# Patient Record
Sex: FEMALE | Race: BLACK OR AFRICAN AMERICAN | Employment: FULL TIME | URBAN - METROPOLITAN AREA
[De-identification: names, ages, dates, MRNs, and addresses within clinical notes are randomized per-mention and may not be internally consistent; named-entity substitution may affect disease eponyms.]

---

## 2019-10-11 LAB — PAP SMEAR, EXTERNAL: NORMAL

## 2020-10-26 ENCOUNTER — NURSE TRIAGE (OUTPATIENT)
Dept: OBGYN CLINIC | Age: 30
End: 2020-10-26

## 2020-10-26 DIAGNOSIS — B96.89 BV (BACTERIAL VAGINOSIS): Primary | ICD-10-CM

## 2020-10-26 DIAGNOSIS — B37.9 YEAST INFECTION: Primary | ICD-10-CM

## 2020-10-26 DIAGNOSIS — N76.0 BV (BACTERIAL VAGINOSIS): Primary | ICD-10-CM

## 2020-10-26 RX ORDER — METRONIDAZOLE 500 MG/1
500 TABLET ORAL 2 TIMES DAILY
Qty: 14 TAB | Refills: 0 | Status: SHIPPED | OUTPATIENT
Start: 2020-10-26 | End: 2020-11-02

## 2020-10-26 RX ORDER — FLUCONAZOLE 150 MG/1
150 TABLET ORAL DAILY
Qty: 1 TAB | Refills: 0 | Status: SHIPPED | OUTPATIENT
Start: 2020-10-26 | End: 2020-10-27

## 2020-10-26 NOTE — TELEPHONE ENCOUNTER
[facility] would like rx sent to pharmacy. Spoke with Dr DUNHAM and she clifton ok.

## 2020-10-26 NOTE — TELEPHONE ENCOUNTER
Per MD send prescription requested and advise patient if medication does not work to schedule an appt.

## 2020-10-26 NOTE — TELEPHONE ENCOUNTER
Patient called back to office reports that she was sent pills to her pharmacy does not work. She reports that pills and cream makes it work. Suggested to patient that she try something over-the-counter. She is requesting Diflucan rather that flagyl that was sent for her sent to her [facility] in [facility].

## 2021-01-02 VITALS
HEIGHT: 59 IN | WEIGHT: 164.6 LBS | SYSTOLIC BLOOD PRESSURE: 134 MMHG | DIASTOLIC BLOOD PRESSURE: 95 MMHG | HEART RATE: 64 BPM | BODY MASS INDEX: 33.18 KG/M2

## 2021-01-02 PROBLEM — B96.89 BACTERIAL VAGINOSIS: Status: ACTIVE | Noted: 2020-03-09

## 2021-01-02 PROBLEM — N76.0 BACTERIAL VAGINOSIS: Status: ACTIVE | Noted: 2020-03-09

## 2021-01-02 RX ORDER — BACLOFEN 10 MG/1
TABLET ORAL 3 TIMES DAILY
COMMUNITY
End: 2021-02-08

## 2021-01-02 RX ORDER — CITALOPRAM 10 MG/1
TABLET ORAL DAILY
COMMUNITY
End: 2021-02-08

## 2021-01-02 RX ORDER — IBUPROFEN 800 MG/1
TABLET ORAL
COMMUNITY
End: 2021-02-08

## 2021-02-08 ENCOUNTER — OFFICE VISIT (OUTPATIENT)
Dept: OBGYN CLINIC | Age: 31
End: 2021-02-08
Payer: MEDICAID

## 2021-02-08 VITALS
BODY MASS INDEX: 35.28 KG/M2 | DIASTOLIC BLOOD PRESSURE: 85 MMHG | HEART RATE: 68 BPM | HEIGHT: 59 IN | SYSTOLIC BLOOD PRESSURE: 141 MMHG | WEIGHT: 175 LBS

## 2021-02-08 DIAGNOSIS — N89.8 VAGINAL ODOR: ICD-10-CM

## 2021-02-08 DIAGNOSIS — B96.89 BACTERIAL VAGINOSIS: ICD-10-CM

## 2021-02-08 DIAGNOSIS — Z11.3 VENEREAL DISEASE SCREENING: ICD-10-CM

## 2021-02-08 DIAGNOSIS — N76.0 BACTERIAL VAGINOSIS: ICD-10-CM

## 2021-02-08 DIAGNOSIS — N92.3 INTERMENSTRUAL SPOTTING: Primary | ICD-10-CM

## 2021-02-08 DIAGNOSIS — N89.8 VAGINAL PRURITUS: ICD-10-CM

## 2021-02-08 LAB — WET MOUNT POCT, WMPOCT: NORMAL

## 2021-02-08 PROCEDURE — 99214 OFFICE O/P EST MOD 30 MIN: CPT | Performed by: OBSTETRICS & GYNECOLOGY

## 2021-02-08 PROCEDURE — 87210 SMEAR WET MOUNT SALINE/INK: CPT | Performed by: OBSTETRICS & GYNECOLOGY

## 2021-02-08 RX ORDER — FLUCONAZOLE 150 MG/1
TABLET ORAL
Qty: 1 TAB | Refills: 1 | Status: SHIPPED | OUTPATIENT
Start: 2021-02-08 | End: 2021-03-11 | Stop reason: ALTCHOICE

## 2021-02-08 RX ORDER — METRONIDAZOLE 500 MG/1
500 TABLET ORAL EVERY 12 HOURS
Qty: 14 TAB | Refills: 1 | Status: SHIPPED | OUTPATIENT
Start: 2021-02-08 | End: 2021-02-15

## 2021-02-08 NOTE — PROGRESS NOTES
Rachana Gilmore is a 35 y.o. female [id number], lmp 1/27/21, who presents today for the following:  Chief Complaint   Patient presents with    Vaginitis     Patient c/o vaginal spotting and itching      Patient states she has been having intermenstrual spotting times a month and a half. This is the first occurrence of such. She is not on any hormonal contraceptives. She has vaginal odor x a few weeks. She is sexually active with one male partner. Denies h/o STIs. Denies abnml vaginal discharge. +Vaginal pruritus. Review of Systems   Constitutional: Negative for chills and fever. Respiratory: Negative for shortness of breath. Cardiovascular: Negative for chest pain. Gastrointestinal: Negative for abdominal pain, constipation, diarrhea, nausea and vomiting. Genitourinary: Negative for dysuria. No current outpatient medications on file. No current facility-administered medications for this visit. Past Medical History:   Diagnosis Date    Anxiety     Herpes simplex            BP (!) 141/85 (BP 1 Location: Left upper arm, BP Patient Position: Sitting, BP Cuff Size: Adult)   Pulse 68   Ht 4' 11\" (1.499 m)   Wt 175 lb (79.4 kg)   LMP 01/27/2021 (Approximate)   BMI 35.35 kg/m²    Physical Exam  Constitutional:       Appearance: Normal appearance. Genitourinary:      Genitourinary Comments: Vulva: no masses, atrophy, or lesions. Vagina: no tenderness, erythema, cystocele, rectocele, abnormal vaginal discharge, or vesicle(s), lesions, or ulcers. Cervix: no cervical motion tenderness; thin white moderate amt of discharge; swab obtained; Uterus: normal size and shape and midline, mobile, non-tender, and no uterine prolapse. Bladder/Urethra: no urethral discharge or mass and normal meatus and bladder non distended. Adnexa/Parametria: no parametrial tenderness or mass and no adnexal tenderness or ovarian mass. HENT:      Head: Normocephalic and atraumatic.    Eyes:      Extraocular Movements: Extraocular movements intact. Neck:      Musculoskeletal: Normal range of motion. Pulmonary:      Effort: Pulmonary effort is normal.   Abdominal:      General: Abdomen is flat. Palpations: Abdomen is soft. Neurological:      Mental Status: She is alert and oriented to person, place, and time. Skin:     General: Skin is warm and dry. Psychiatric:         Mood and Affect: Mood normal.         Behavior: Behavior normal.   Vitals signs reviewed. Results for orders placed or performed in visit on 02/08/21   AMB POC SMEAR, STAIN & INTERPRET, WET MOUNT   Result Value Ref Range    Wet mount (POC)      Narrative    Wet mount:   +clue cells. Neg for yeast. Neg for trich. Koh prep: + whiff; neg yeast         Assessment/plan:     ICD-10-CM ICD-9-CM    1. Intermenstrual spotting  N92.3 626.6 US PELV NON OBS   2. Vaginal odor  N89.8 625.8 AMB POC SMEAR, STAIN & INTERPRET, WET MOUNT   3. Vaginal pruritus  N89.8 698.1 AMB POC SMEAR, STAIN & INTERPRET, WET MOUNT   4. Bacterial vaginosis  N76.0 616.10 metroNIDAZOLE (FLAGYL) 500 mg tablet    B96.89 041.9 fluconazole (DIFLUCAN) 150 mg tablet   5. Venereal disease screening  Z11.3 V74.5 CT/NG/T.VAGINALIS AMPLIFICATION     Pelvic us ordered for intermenstrual spotting. Treat BV. Check GCT.

## 2021-02-09 LAB
C TRACH RRNA SPEC QL NAA+PROBE: NEGATIVE
N GONORRHOEA RRNA SPEC QL NAA+PROBE: NEGATIVE
T VAGINALIS DNA SPEC QL NAA+PROBE: POSITIVE

## 2021-02-10 PROBLEM — A59.01 TRICHOMONAS VAGINITIS: Status: ACTIVE | Noted: 2021-02-10

## 2021-02-10 NOTE — PROGRESS NOTES
I tried to call the patient-no answer. Left voicemail letting her know that we will call her back. Can you please let her know that the swab is showing she has trichomonas. I had already sent in Metronidazole at her visit so that will treat this. Discuss partner testing therapy and abstinence from sex until both treated. She needs a test of cure in 4 weeks. Thank you.

## 2021-02-12 ENCOUNTER — TELEPHONE (OUTPATIENT)
Dept: OBGYN CLINIC | Age: 31
End: 2021-02-12

## 2021-02-12 NOTE — TELEPHONE ENCOUNTER
Patient called to the office yesterday and results and instruction given. Appointment made for LEANNE.

## 2021-02-12 NOTE — TELEPHONE ENCOUNTER
----- Message from Silviano Castillo MD sent at 2/10/2021  2:16 PM EST -----  I tried to call the patient-no answer. Left voicemail letting her know that we will call her back. Can you please let her know that the swab is showing she has trichomonas. I had already sent in Metronidazole at her visit so that will treat this. Discuss partner testing therapy and abstinence from sex until both treated. She needs a test of cure in 4 weeks. Thank you.

## 2021-03-11 ENCOUNTER — OFFICE VISIT (OUTPATIENT)
Dept: OBGYN CLINIC | Age: 31
End: 2021-03-11
Payer: MEDICAID

## 2021-03-11 VITALS
HEART RATE: 88 BPM | DIASTOLIC BLOOD PRESSURE: 88 MMHG | OXYGEN SATURATION: 100 % | WEIGHT: 169 LBS | HEIGHT: 59 IN | BODY MASS INDEX: 34.07 KG/M2 | SYSTOLIC BLOOD PRESSURE: 136 MMHG | TEMPERATURE: 97.6 F

## 2021-03-11 DIAGNOSIS — A59.01 TRICHOMONAS VAGINITIS: ICD-10-CM

## 2021-03-11 DIAGNOSIS — N76.0 BACTERIAL VAGINOSIS: ICD-10-CM

## 2021-03-11 DIAGNOSIS — B96.89 BACTERIAL VAGINOSIS: ICD-10-CM

## 2021-03-11 DIAGNOSIS — N89.8 VAGINAL PRURITUS: ICD-10-CM

## 2021-03-11 DIAGNOSIS — R30.0 DYSURIA: Primary | ICD-10-CM

## 2021-03-11 LAB
BILIRUB UR QL STRIP: NEGATIVE
GLUCOSE UR-MCNC: NEGATIVE MG/DL
KETONES P FAST UR STRIP-MCNC: NEGATIVE MG/DL
PH UR STRIP: 6.5 [PH] (ref 4.6–8)
PROT UR QL STRIP: NEGATIVE
SP GR UR STRIP: 1.03 (ref 1–1.03)
UA UROBILINOGEN AMB POC: NORMAL (ref 0.2–1)
URINALYSIS CLARITY POC: CLEAR
URINALYSIS COLOR POC: NORMAL
URINE BLOOD POC: NORMAL
URINE LEUKOCYTES POC: NORMAL
URINE NITRITES POC: NEGATIVE

## 2021-03-11 PROCEDURE — 81003 URINALYSIS AUTO W/O SCOPE: CPT | Performed by: OBSTETRICS & GYNECOLOGY

## 2021-03-11 PROCEDURE — 99212 OFFICE O/P EST SF 10 MIN: CPT | Performed by: OBSTETRICS & GYNECOLOGY

## 2021-03-11 NOTE — PROGRESS NOTES
Joce Finney is a 35 y.o. female [id number] who presents today for the following:  Chief Complaint   Patient presents with    Follow-up     Needs LEANNE      Patient completed the Metronidazole for Trichomonas. States partner was also treated. She has not had intercourse since. She denies abnormal vaginal discharge or odors. +Vaignal pruritus. Also requesting check for a UTI. Review of Systems   Constitutional: Negative for chills and fever. Respiratory: Negative for shortness of breath. Cardiovascular: Negative for chest pain. Gastrointestinal: Negative for abdominal pain, constipation, diarrhea, nausea and vomiting. Genitourinary: Negative for dysuria. No current outpatient medications on file. No current facility-administered medications for this visit. Past Medical History:   Diagnosis Date    Anxiety     Herpes simplex            /88 (BP 1 Location: Right upper arm, BP Patient Position: Sitting, BP Cuff Size: Adult)   Pulse 88   Temp 97.6 °F (36.4 °C) (Temporal)   Ht 4' 11\" (1.499 m)   Wt 169 lb (76.7 kg)   SpO2 100%   BMI 34.13 kg/m²    Physical Exam  Constitutional:       Appearance: Normal appearance. Genitourinary:      Genitourinary Comments: Vulva: no masses, atrophy, or lesions. Vagina: swab obtained   HENT:      Head: Normocephalic and atraumatic. Eyes:      Extraocular Movements: Extraocular movements intact. Neck:      Musculoskeletal: Normal range of motion. Pulmonary:      Effort: Pulmonary effort is normal.   Neurological:      Mental Status: She is alert and oriented to person, place, and time. Skin:     General: Skin is warm and dry. Psychiatric:         Mood and Affect: Mood normal.         Behavior: Behavior normal.   Vitals signs reviewed.         Results for orders placed or performed in visit on 03/11/21   AMB POC URINALYSIS DIP STICK AUTO W/O MICRO   Result Value Ref Range    Color (UA POC) Stephenie     Clarity (UA POC) Clear Glucose (UA POC) Negative Negative    Bilirubin (UA POC) Negative Negative    Ketones (UA POC) Negative Negative    Specific gravity (UA POC) 1.030 1.001 - 1.035    Blood (UA POC) Trace Negative    pH (UA POC) 6.5 4.6 - 8.0    Protein (UA POC) Negative Negative    Urobilinogen (UA POC) 0.2 mg/dL 0.2 - 1    Nitrites (UA POC) Negative Negative    Leukocyte esterase (UA POC) Trace Negative         Assessment/plan:     ICD-10-CM ICD-9-CM    1. Dysuria  R30.0 788.1 AMB POC URINALYSIS DIP STICK AUTO W/O MICRO   2. Trichomonas vaginitis  A59.01 131.01 NUAB VAGINITIS   3. Vaginal pruritus  N89.8 698.1 NUAB VAGINITIS       Will check for BV, yeast, and trich. Urine dip neg for UTI.

## 2021-03-12 ENCOUNTER — TELEPHONE (OUTPATIENT)
Dept: OBGYN CLINIC | Age: 31
End: 2021-03-12

## 2021-03-12 NOTE — TELEPHONE ENCOUNTER
I called the patient and left a message stating that she could call and schedule her appointment with [facility] Centralized Scheduling and number was given.

## 2021-03-12 NOTE — TELEPHONE ENCOUNTER
----- Message from Grace Chris RN sent at 3/12/2021 12:04 PM EST -----  Regarding: Non-Urgent Medical Question  Contact: [phone]  Please reach out to this patient and give her the number for central scheduling.    ----- Message -----  From: Dick Eisenmenger  Sent: 3/11/2021   4:37 PM EST  To: Indra Araujo Nurse  Subject: Non-Urgent Medical Question                      Good evening , I had a follow up appointment with you this morning and was wondering when will someone be calling me about scheduling an US pelvic I see the order from my last appointment but I never got a call.

## 2021-03-14 LAB
A VAGINAE DNA VAG QL NAA+PROBE: ABNORMAL SCORE
BVAB2 DNA VAG QL NAA+PROBE: ABNORMAL SCORE
C ALBICANS DNA VAG QL NAA+PROBE: NEGATIVE
C GLABRATA DNA VAG QL NAA+PROBE: NEGATIVE
MEGA1 DNA VAG QL NAA+PROBE: ABNORMAL SCORE
T VAGINALIS DNA VAG QL NAA+PROBE: POSITIVE

## 2021-03-15 RX ORDER — FLUCONAZOLE 150 MG/1
TABLET ORAL
Qty: 1 TAB | Refills: 1 | Status: SHIPPED | OUTPATIENT
Start: 2021-03-15 | End: 2021-05-25 | Stop reason: ALTCHOICE

## 2021-03-15 RX ORDER — METRONIDAZOLE 500 MG/1
500 TABLET ORAL EVERY 12 HOURS
Qty: 14 TAB | Refills: 0 | Status: SHIPPED | OUTPATIENT
Start: 2021-03-15 | End: 2021-03-22

## 2021-03-15 NOTE — PROGRESS NOTES
PORTAL MESSAGE    Peter Manley, I saw your note stating you do not need a phone call. Just to review, the vaginal swab does show trichomonas, and bacterial vaginosis. I will send in metronidazole to the pharmacy for you. Abstain from intercourse while being treated. Please let your partner know to get treated. Call the office to schedule a test of cure in 4 weeks.     Thank you,  Dr. Ivon Bull.

## 2021-04-27 ENCOUNTER — OFFICE VISIT (OUTPATIENT)
Dept: OBGYN CLINIC | Age: 31
End: 2021-04-27
Payer: MEDICAID

## 2021-04-27 VITALS
OXYGEN SATURATION: 99 % | SYSTOLIC BLOOD PRESSURE: 133 MMHG | BODY MASS INDEX: 35.28 KG/M2 | WEIGHT: 175 LBS | DIASTOLIC BLOOD PRESSURE: 90 MMHG | TEMPERATURE: 97 F | HEART RATE: 64 BPM | HEIGHT: 59 IN

## 2021-04-27 DIAGNOSIS — A59.01 TRICHOMONAL VULVOVAGINITIS: Primary | ICD-10-CM

## 2021-04-27 PROCEDURE — 99213 OFFICE O/P EST LOW 20 MIN: CPT | Performed by: OBSTETRICS & GYNECOLOGY

## 2021-04-27 RX ORDER — METRONIDAZOLE 500 MG/1
500 TABLET ORAL 2 TIMES DAILY
Qty: 14 TAB | Refills: 0 | Status: SHIPPED | OUTPATIENT
Start: 2021-04-27 | End: 2021-05-04

## 2021-04-27 NOTE — PROGRESS NOTES
1. Have you been to the ER, urgent care clinic since your last visit? Hospitalized since your last visit? no    2. Have you seen or consulted any other health care providers outside of the [address] [place] [address] since your last visit? Include any pap smears or colon screening.   no    Chief Complaint   Patient presents with   [address]

## 2021-04-27 NOTE — PROGRESS NOTES
Mackenzie Mcmanus is a  35 y.o. female BLACK/  LMP 3/30/2021, history of one spontaneous vaginal delivery May 2015 viable male infant 6 pounds 11 ounces at full-term without complication, laparoscopy with left salpingostomy for ectopic pregnancy , spontaneous  , HSV diagnosed , 1 year history of oral contraceptive use complicated by noncompliance and 1 year history of Mirena use complicated by irregular bleeding and pain, recently diagnosed with trichomonas, who presents for test of cure. Patient was diagnosed with trichomonas in 2021 treated with Flagyl, however her boyfriend is uninsured and never was treated. She remains sexually active with him having sex with him approximately 2 weeks ago. They have been together since 2020 and she denies pain or bleeding with intercourse. She currently reports yellow vaginal discharge, as well as itching, but denies odor pelvic pain and vaginal bleeding. Patient currently works at the [employer]  Patient is a non-smoker    Last Pap smear 2019-negative      Past Medical History:   Diagnosis Date    Anxiety     Herpes simplex      Past Surgical History:   Procedure Laterality Date    HX  SECTION      HX DILATION AND CURETTAGE      HX OTHER SURGICAL      Laparoscopy salpingostomy    HX WISDOM TEETH EXTRACTION        Social History     Socioeconomic History    Marital status: SINGLE     Spouse name: Not on file    Number of children: Not on file    Years of education: Not on file    Highest education level: Not on file   Tobacco Use    Smoking status: Never Smoker    Smokeless tobacco: Never Used   Substance and Sexual Activity    Alcohol use:  Yes    Drug use: Not Currently    Sexual activity: Yes     Partners: Male      Family History   Family history unknown: Yes      Current Outpatient Medications on File Prior to Visit   Medication Sig Dispense Refill    fluconazole (DIFLUCAN) 150 mg tablet Take 1 tablet by mouth x1 dose to prevent a yeast infection after completing the antibiotics. Repeat dose in 72 hours if symptoms continue. Indications: treatment to prevent vulvovaginal yeast infection 1 Tab 1     No current facility-administered medications on file prior to visit. Allergies as of 04/27/2021    (No Known Allergies)       Visit Vitals  BP (!) 133/90 (BP 1 Location: Left upper arm, BP Patient Position: Sitting)   Pulse 64   Temp 97 °F (36.1 °C)   Ht 4' 11\" (1.499 m)   Wt 175 lb (79.4 kg)   LMP 03/30/2021 (Exact Date)   SpO2 99%   BMI 35.35 kg/m²       Constitutional:   Chaperone: accepted and present. General Appearance: healthy-appearing, well-nourished, and well-developed; black female. Level of Distress: NAD. Ambulation: ambulating normally. Psychiatric:   Insight: good judgement. Mental Status: normal mood and affect and active and alert. Orientation: to time, place, and person. Memory: recent memory normal and remote memory normal.     Head: Head: normocephalic and atraumatic. Neck:   Neck: supple, FROM, trachea midline, and no masses. Lymph Nodes: no cervical LAD, supraclavicular LAD, axillary LAD, or inguinal LAD. Thyroid: no enlargement or nodules and non-tender. Lungs:   Respiratory effort: no dyspnea. Auscultation: no wheezing, rales/crackles, or rhonchi and breath sounds normal, good air movement, and CTA except as noted. Cardiovascular:   Heart Auscultation: normal S1 and S2; no murmurs, rubs, or gallops; and RRR. Pulses including femoral / pedal: normal throughout. Breast: Breast: no masses or abnormal secretions and normal appearance. Abdomen: Bowel Sounds: normal.   Inspection and Palpation: no tenderness, guarding, masses, rebound tenderness, or CVA tenderness and non-distended. Liver: non-tender and no hepatomegaly. Spleen: non-tender and no splenomegaly. Hernia: none palpable.    Incisions multiple well-healed laparoscopic incisions    Musculoskeletal[de-identified]   Motor Strength and Tone: normal tone and motor strength. Joints, Bones, and Muscles: no contractures, malalignment, tenderness, or bony abnormalities and normal movement of all extremities. Extremities: no cyanosis, edema, varicosities, or palpable cord. Neurologic:   Gait and Station: normal gait and station. Sensation: grossly intact. Reflexes: DTRs 2+ bilaterally throughout. Skin:   Inspection and palpation: Multiple tattoos noted; no rash, lesions, ulcer, induration, nodules, jaundice, or abnormal nevi and good turgor. Nails: normal.     Back: Thoracolumbar Appearance: normal curvature. ICD-10-CM ICD-9-CM    1. Trichomonal vulvovaginitis  A59.01 131.01      Trichomonas in a patient whose partner is having difficulty being treated. Feel expedited partner therapy is warranted. Prescription written and patient counseled regarding use.   We will follow up in 3 weeks for test of cure

## 2021-05-25 ENCOUNTER — OFFICE VISIT (OUTPATIENT)
Dept: OBGYN CLINIC | Age: 31
End: 2021-05-25
Payer: MEDICAID

## 2021-05-25 VITALS
WEIGHT: 172 LBS | OXYGEN SATURATION: 99 % | HEIGHT: 59 IN | HEART RATE: 80 BPM | DIASTOLIC BLOOD PRESSURE: 86 MMHG | SYSTOLIC BLOOD PRESSURE: 132 MMHG | TEMPERATURE: 98 F | BODY MASS INDEX: 34.68 KG/M2

## 2021-05-25 DIAGNOSIS — N91.2 AMENORRHEA: Primary | ICD-10-CM

## 2021-05-25 DIAGNOSIS — Z11.3 SCREENING FOR STDS (SEXUALLY TRANSMITTED DISEASES): ICD-10-CM

## 2021-05-25 DIAGNOSIS — N92.1 BREAKTHROUGH BLEEDING: ICD-10-CM

## 2021-05-25 LAB
HCG URINE, QL. (POC): NEGATIVE
VALID INTERNAL CONTROL?: NO

## 2021-05-25 PROCEDURE — 99213 OFFICE O/P EST LOW 20 MIN: CPT | Performed by: OBSTETRICS & GYNECOLOGY

## 2021-05-25 PROCEDURE — 81025 URINE PREGNANCY TEST: CPT | Performed by: OBSTETRICS & GYNECOLOGY

## 2021-05-25 NOTE — PROGRESS NOTES
.1. Have you been to the ER, urgent care clinic since your last visit? Hospitalized since your last visit? no    2. Have you seen or consulted any other health care providers outside of the [address] [place] [address] since your last visit? Include any pap smears or colon screening.   no    Chief Complaint   Patient presents with    Follow-up     c\o spotting and still itching

## 2021-05-25 NOTE — PROGRESS NOTES
Lewis Medina is a  35 y.o. female BLACK/  LMP 2021, history of one spontaneous vaginal delivery May 2015 viable male infant 6 pounds 11 ounces at full-term without complication, laparoscopy with left salpingostomy for ectopic pregnancy , spontaneous  , HSV diagnosed , 1 year history of oral contraceptive use complicated by noncompliance and 1 year history of Mirena use complicated by irregular bleeding and pain, recently diagnosed with trichomonas, who presents for test of cure. When the patient was seen in April, it was felt that she still probably had trichomonas, and both her boyfriend and her were then treated. She returns today and her only complaint is that of some vaginal spotting, and is wearing a panty liner. She denies vaginal discharge itching or odor. Patient has been sexually active with the same partner since April/May 2020. Last sexual encounter was 2 weeks ago and she denies pain with intercourse. She is currently using condoms for contraception. Patient currently works at [employer]  Patient is a non-smoker    Last Pap smear 2019-negative      Past Medical History:   Diagnosis Date    Anxiety     Herpes simplex      Past Surgical History:   Procedure Laterality Date    HX  SECTION      HX DILATION AND CURETTAGE      HX OTHER SURGICAL      Laparoscopy salpingostomy    HX WISDOM TEETH EXTRACTION        Social History     Socioeconomic History    Marital status: SINGLE     Spouse name: Not on file    Number of children: Not on file    Years of education: Not on file    Highest education level: Not on file   Tobacco Use    Smoking status: Never Smoker    Smokeless tobacco: Never Used   Substance and Sexual Activity    Alcohol use:  Yes    Drug use: Not Currently    Sexual activity: Yes     Partners: Male     Social Determinants of Health     Financial Resource Strain:     Difficulty of Paying Living Expenses:    Food Insecurity:     Worried About Running Out of Food in the Last Year:     [address] in the Last Year:    Transportation Needs:     Lack of Transportation (Medical):  Lack of Transportation (Non-Medical):    Physical Activity:     Days of Exercise per Week:     Minutes of Exercise per Session:    Stress:     Feeling of Stress :    Social Connections:     Frequency of Communication with Friends and Family:     Frequency of Social Gatherings with Friends and Family:     Attends [employer] Services:     Active Member of Clubs or Organizations:     Attends Club or Organization Meetings:     Marital Status:       Family History   Family history unknown: Yes      No current outpatient medications on file prior to visit. No current facility-administered medications on file prior to visit. Allergies as of 05/25/2021    (No Known Allergies)       Visit Vitals  /86 (BP 1 Location: Left upper arm, BP Patient Position: Sitting)   Pulse 80   Temp 98 °F (36.7 °C)   Ht 4' 11\" (1.499 m)   Wt 172 lb (78 kg)   LMP 04/27/2021 (Exact Date)   SpO2 99%   BMI 34.74 kg/m²       Constitutional:   Chaperone: accepted and present. General Appearance: healthy-appearing, well-nourished, and well-developed; black female. Level of Distress: NAD. Ambulation: ambulating normally. Psychiatric:   Insight: good judgement. Mental Status: normal mood and affect and active and alert. Orientation: to time, place, and person. Memory: recent memory normal and remote memory normal.     Head: Head: normocephalic and atraumatic. Neck:   Neck: supple, FROM, trachea midline, and no masses. Lymph Nodes: no cervical LAD, supraclavicular LAD, axillary LAD, or inguinal LAD. Thyroid: no enlargement or nodules and non-tender. Lungs:   Respiratory effort: no dyspnea. Auscultation: no wheezing, rales/crackles, or rhonchi and breath sounds normal, good air movement, and CTA except as noted. Cardiovascular:   Heart Auscultation: normal S1 and S2; no murmurs, rubs, or gallops; and RRR. Pulses including femoral / pedal: normal throughout. Breast: Breast: no masses or abnormal secretions and normal appearance. Abdomen: Bowel Sounds: normal.   Inspection and Palpation: no tenderness, guarding, masses, rebound tenderness, or CVA tenderness and non-distended. Liver: non-tender and no hepatomegaly. Spleen: non-tender and no splenomegaly. Hernia: none palpable. Incisions multiple well-healed laparoscopic incisions    Female :   External genitalia: no lesions or rash and normal.   Vagina: moist mucosa; bloody discharge. Cervix: no discharge, inflammation, or cervical motion tenderness   Uterus: midline, smooth, and non-tender; normal size   Adnexae: no adnexal mass or tenderness and size WNL. Bladder and Urethra: normal bladder and urethra (except where noted). Musculoskeletal[de-identified]   Motor Strength and Tone: normal tone and motor strength. Joints, Bones, and Muscles: no contractures, malalignment, tenderness, or bony abnormalities and normal movement of all extremities. Extremities: no cyanosis, edema, varicosities, or palpable cord. Neurologic:   Gait and Station: normal gait and station. Sensation: grossly intact. Reflexes: DTRs 2+ bilaterally throughout. Skin:   Inspection and palpation: Multiple tattoos noted; no rash, lesions, ulcer, induration, nodules, jaundice, or abnormal nevi and good turgor. Nails: normal.     Back: Thoracolumbar Appearance: normal curvature. ICD-10-CM ICD-9-CM    1. Amenorrhea  N91.2 626.0 AMB POC URINE PREGNANCY TEST, VISUAL COLOR COMPARISON   2. Screening for STDs (sexually transmitted diseases)  Z11.3 V74.5 CT/NG/T.VAGINALIS AMPLIFICATION     Breakthrough bleeding without clear etiology.   hCG is currently negative   check STD screen  Follow-up in 4 weeks; consider ultrasound if STD screen is negative

## 2021-05-27 LAB
C TRACH RRNA SPEC QL NAA+PROBE: NEGATIVE
N GONORRHOEA RRNA SPEC QL NAA+PROBE: NEGATIVE
T VAGINALIS DNA SPEC QL NAA+PROBE: NEGATIVE

## 2022-03-01 ENCOUNTER — OFFICE VISIT (OUTPATIENT)
Dept: OBGYN CLINIC | Age: 32
End: 2022-03-01
Payer: MEDICAID

## 2022-03-01 VITALS
TEMPERATURE: 98 F | HEIGHT: 59 IN | DIASTOLIC BLOOD PRESSURE: 75 MMHG | SYSTOLIC BLOOD PRESSURE: 156 MMHG | HEART RATE: 64 BPM | BODY MASS INDEX: 35.48 KG/M2 | OXYGEN SATURATION: 100 % | RESPIRATION RATE: 16 BRPM | WEIGHT: 176 LBS

## 2022-03-01 DIAGNOSIS — Z01.419 ENCOUNTER FOR GYNECOLOGICAL EXAMINATION WITHOUT ABNORMAL FINDING: ICD-10-CM

## 2022-03-01 DIAGNOSIS — Z12.4 SCREENING FOR CERVICAL CANCER: ICD-10-CM

## 2022-03-01 DIAGNOSIS — Z11.3 VENEREAL DISEASE SCREENING: ICD-10-CM

## 2022-03-01 DIAGNOSIS — R30.0 DYSURIA: Primary | ICD-10-CM

## 2022-03-01 DIAGNOSIS — Z12.39 ENCOUNTER FOR BREAST CANCER SCREENING USING NON-MAMMOGRAM MODALITY: ICD-10-CM

## 2022-03-01 DIAGNOSIS — N92.3 INTERMENSTRUAL SPOTTING: ICD-10-CM

## 2022-03-01 DIAGNOSIS — D25.1 INTRAMURAL LEIOMYOMA OF UTERUS: ICD-10-CM

## 2022-03-01 PROBLEM — N76.0 BACTERIAL VAGINOSIS: Status: RESOLVED | Noted: 2020-03-09 | Resolved: 2022-03-01

## 2022-03-01 PROBLEM — B96.89 BACTERIAL VAGINOSIS: Status: RESOLVED | Noted: 2020-03-09 | Resolved: 2022-03-01

## 2022-03-01 PROBLEM — A59.01 TRICHOMONAS VAGINITIS: Status: RESOLVED | Noted: 2021-02-10 | Resolved: 2022-03-01

## 2022-03-01 LAB
BILIRUB UR QL STRIP: NEGATIVE
GLUCOSE UR-MCNC: NEGATIVE MG/DL
KETONES P FAST UR STRIP-MCNC: NEGATIVE MG/DL
PH UR STRIP: 6 [PH] (ref 4.6–8)
PROT UR QL STRIP: NEGATIVE
SP GR UR STRIP: 1.03 (ref 1–1.03)
UA UROBILINOGEN AMB POC: NORMAL (ref 0.2–1)
URINALYSIS CLARITY POC: CLEAR
URINALYSIS COLOR POC: YELLOW
URINE BLOOD POC: NORMAL
URINE LEUKOCYTES POC: NEGATIVE
URINE NITRITES POC: NEGATIVE

## 2022-03-01 PROCEDURE — 99213 OFFICE O/P EST LOW 20 MIN: CPT | Performed by: OBSTETRICS & GYNECOLOGY

## 2022-03-01 PROCEDURE — 99395 PREV VISIT EST AGE 18-39: CPT | Performed by: OBSTETRICS & GYNECOLOGY

## 2022-03-01 PROCEDURE — 81003 URINALYSIS AUTO W/O SCOPE: CPT | Performed by: OBSTETRICS & GYNECOLOGY

## 2022-03-01 NOTE — PROGRESS NOTES
1. Have you been to the ER, urgent care clinic since your last visit? Hospitalized since your last visit? no    2. Have you seen or consulted any other health care providers outside of the [address] [place] [address] since your last visit? Include any pap smears or colon screening.   no    Chief Complaint   Patient presents with    Annual Exam     pt complains of spotting for about a week      Visit Vitals  BP (!) 156/75 (BP 1 Location: Left upper arm, BP Patient Position: Sitting, BP Cuff Size: Adult)   Pulse 64   Temp 98 °F (36.7 °C) (Temporal)   Resp 16   Ht 4' 11\" (1.499 m)   Wt 176 lb (79.8 kg)   LMP 02/11/2022 (Exact Date)   SpO2 100%   BMI 35.55 kg/m²

## 2022-03-01 NOTE — PATIENT INSTRUCTIONS
Preventing Osteoporosis: Care Instructions  Your Care Instructions     Osteoporosis means the bones are weak and thin enough that they can break easily. The older you are, the more likely you are to get osteoporosis. But with plenty of calcium, vitamin D, and exercise, you can help prevent osteoporosis. The preteen and teen years are a key time for bone building. With the help of calcium, vitamin D, and exercise in those early years and beyond, the bones reach their peak density and strength by age [address] [place]. After age [address] [place], your bones naturally start to thin and weaken. The stronger your bones are at around age [address] [place], the lower your risk for osteoporosis. But no matter what your age and risk are, your bones still need calcium, vitamin D, and exercise to stay strong. Also avoid smoking, and limit alcohol. Smoking and heavy alcohol use can make your bones thinner. Talk to your doctor about any special risks you might have, such as having a close relative with osteoporosis or taking a medicine that can weaken bones. Your doctor can tell you the best ways to protect your bones from thinning. Follow-up care is a key part of your treatment and safety. Be sure to make and go to all appointments, and call your doctor if you are having problems. It's also a good idea to know your test results and keep a list of the medicines you take. How can you care for yourself at home? · Get enough calcium and vitamin D.  ? Eat foods rich in calcium, like yogurt, cheese, milk, and dark green vegetables. ? Eat foods rich in vitamin D, like eggs, fatty fish, cereal, and fortified milk. ? Get some sunshine. Your body uses sunshine to make its own vitamin D.  ? Talk to your doctor about taking a calcium plus vitamin D supplement if you don't think you are getting enough through your diet and sunshine. Get enough calcium and vitamin D.  The recommended daily allowances (RDAs) for adults younger than age 46 are 1,000 mg of calcium and 600 IU of vitamin D each day. Women ages 46 to 79 need 1,200 mg of calcium and 600 IU of vitamin D each day. Men ages 46 to 79 need 1,000 mg of calcium and 600 IU of vitamin D each day. Adults 71 and older need 1,200 mg of calcium and 800 IU of vitamin D each day. It's not clear if people who already have osteoporosis need more calcium and vitamin D than this. Talk to your doctor about what's right for you. Eat foods rich in calcium, like yogurt, cheese, milk, and dark green vegetables. This is a good way to get the calcium you need. You can get vitamin D from eggs, fatty fish, cereal, and milk. Ask your doctor if you need to take a calcium plus vitamin D supplement. You may be able to get enough calcium and vitamin D through your diet. Be careful with supplements. Adults ages 23 to 48 should not get more than 2,500 mg of calcium and 4,000 IU of vitamin D each day, whether it is from supplements and/or food. Adults ages 46 and older should not get more than 2,000 mg of calcium and 4,000 IU of vitamin D each day from supplements and/or food. · Get regular bone-building exercise. Walking, jogging, dancing, and lifting weights can make your bones stronger. · Limit alcohol. Drink no more than 1 alcohol drink a day if you are a woman. Drink no more than 2 alcohol drinks a day if you are a man. · Do not smoke. Smoking can make bones thin faster. If you need help quitting, talk to your doctor about stop-smoking programs and medicines. These can increase your chances of quitting for good. When should you call for help? Watch closely for changes in your health, and be sure to contact your doctor if you have any problems. Where can you learn more? Go to http://www.gray.com/  Enter F691 in the search box to learn more about \"Preventing Osteoporosis: Care Instructions. \"  Current as of: December 7, 2020               Content Version: 13.0  © 4438-2672 [employer].    Care instructions adapted under license by [employer] (which disclaims liability or warranty for this information). If you have questions about a medical condition or this instruction, always ask your healthcare professional. [address] any warranty or liability for your use of this information. Elevated Blood Pressure: Care Instructions  Your Care Instructions    Blood pressure is a measure of how hard the blood pushes against the walls of your arteries. It's normal for blood pressure to go up and down throughout the day. But if it stays up over time, you have high blood pressure. Two numbers tell you your blood pressure. The first number is the systolic pressure. It shows how hard the blood pushes when your heart is pumping. The second number is the diastolic pressure. It shows how hard the blood pushes between heartbeats, when your heart is relaxed and filling with blood. An ideal blood pressure in adults is less than 120/80 (say \"120 over 80\"). High blood pressure is 140/90 or higher. You have high blood pressure if your top number is 140 or higher or your bottom number is 90 or higher, or both. The main test for high blood pressure is simple, fast, and painless. To diagnose high blood pressure, your doctor will test your blood pressure at different times. After testing your blood pressure, your doctor may ask you to test it again when you are home. If you are diagnosed with high blood pressure, you can work with your doctor to make a long-term plan to manage it. Follow-up care is a key part of your treatment and safety. Be sure to make and go to all appointments, and call your doctor if you are having problems. It's also a good idea to know your test results and keep a list of the medicines you take. How can you care for yourself at home? · Do not smoke. Smoking increases your risk for heart attack and stroke.  If you need help quitting, talk to your doctor about stop-smoking programs and medicines. These can increase your chances of quitting for good. · Stay at a healthy weight. · Try to limit how much sodium you eat to less than 2,300 milligrams (mg) a day. Your doctor may ask you to try to eat less than 1,500 mg a day. · Be physically active. Get at least 30 minutes of exercise on most days of the week. Walking is a good choice. You also may want to do other activities, such as running, swimming, cycling, or playing tennis or team sports. · Avoid or limit alcohol. Talk to your doctor about whether you can drink any alcohol. · Eat plenty of fruits, vegetables, and low-fat dairy products. Eat less saturated and total fats. · Learn how to check your blood pressure at home. When should you call for help? Call your doctor now or seek immediate medical care if:  ? · Your blood pressure is much higher than normal (such as 180/110 or higher). ? · You think high blood pressure is causing symptoms such as:  ¨ Severe headache. ¨ Blurry vision. ? Watch closely for changes in your health, and be sure to contact your doctor if:  ? · You do not get better as expected. Where can you learn more? Go to http://www.gray.com/. Enter M757 in the search box to learn more about \"Elevated Blood Pressure: Care Instructions. \"  Current as of: September 21, 2016  Content Version: 11.4  © 8082-5921 [employer]. Care instructions adapted under license by [employer] (which disclaims liability or warranty for this information). If you have questions about a medical condition or this instruction, always ask your healthcare professional. [place] [address] any warranty or liability for your use of this information.

## 2022-03-01 NOTE — PROGRESS NOTES
HPI: Gabriela Umanzor is a 28 y.o. female [id number], LMP 22, who presents today for the following:  Chief Complaint   Patient presents with    Annual Exam     pt complains of spotting for about a week           She denies abnormal vaginal/vulvar pruritus; abnormal vaginal discharge or vaginal odor. She has intermittent vaginal spotting in between menses x 2 months. Occurred in the past too. Denies known h/o polyp of fibroids. Denies h/o abnormal pap smears. H/o trichomonas and HSV. Last mammogram: never. Last colonoscopy: never. Aware of potential problem co billing. Past Medical History:   Diagnosis Date    Anxiety     Herpes simplex        Past Surgical History:   Procedure Laterality Date    HX  SECTION      HX DILATION AND CURETTAGE      HX OTHER SURGICAL      Laparoscopy salpingostomy    HX WISDOM TEETH EXTRACTION         Family History   Adopted: Yes   Family history unknown: Yes       Social History     Socioeconomic History    Marital status: SINGLE     Spouse name: Not on file    Number of children: Not on file    Years of education: Not on file    Highest education level: Associate degree: academic program   Occupational History    Occupation: medical assistant   Tobacco Use    Smoking status: Never Smoker    Smokeless tobacco: Never Used   Vaping Use    Vaping Use: Never used   Substance and Sexual Activity    Alcohol use: Yes    Drug use: Not Currently    Sexual activity: Yes     Partners: Male     Comment: denies h/o abnml pap smears   Other Topics Concern    Not on file   Social History Narrative    Not on file     Social Determinants of Health     Financial Resource Strain:     Difficulty of Paying Living Expenses: Not on file   Food Insecurity:     Worried About Running Out of Food in the Last Year: Not on file    Natasha of Food in the Last Year: Not on file   Transportation Needs:     Lack of Transportation (Medical):  Not on file    Lack of Transportation (Non-Medical): Not on file   Physical Activity: Inactive    Days of Exercise per Week: 0 days    Minutes of Exercise per Session: 0 min   Stress:     Feeling of Stress : Not on file   Social Connections:     Frequency of Communication with Friends and Family: Not on file    Frequency of Social Gatherings with Friends and Family: Not on file    Attends Rastafarian Services: Not on file    Active Member of Clubs or Organizations: Not on file    Attends Club or Organization Meetings: Not on file    Marital Status: Not on file   Intimate Partner Violence: Not At Risk    Fear of Current or Ex-Partner: No    Emotionally Abused: No    Physically Abused: No    Sexually Abused: No   Housing Stability:     Unable to Pay for Housing in the Last Year: Not on file    Number of [place] in the Last Year: Not on file    Unstable Housing in the Last Year: Not on file       No Known Allergies     No current outpatient medications on file. Review of Systems: Denies issues with eyes, ears, mouth, nose. Denies fevers/chills, significant weight loss/gain. Denies chest pain, shortness of breath, nausea, vomiting, constipation, diarrhea or abdominal pain. Denies dysuria. Denies muscle aches, weakness, numbness or tingling. Denies issues with breasts. Denies bleeding/clotting d/o's. Denies depression, S/HI. +Anxiety.        OBJECTIVE:  BP (!) 156/75 (BP 1 Location: Left upper arm, BP Patient Position: Sitting, BP Cuff Size: Adult)   Pulse 64   Temp 98 °F (36.7 °C) (Temporal)   Resp 16   Ht 4' 11\" (1.499 m)   Wt 176 lb (79.8 kg)   LMP 02/11/2022 (Exact Date)   SpO2 100%   BMI 35.55 kg/m²      Constitutional  · Appearance: well-nourished, well developed, alert, in no acute distress, overweight    HENT  · Head and Face: appears normal    Neck  · Inspection/Palpation: normal appearance      Breasts   Symmetric, no palpable masses, no tenderness, no skin changes, no nipple abnormality, no nipple discharge, no axillary or supraclavicular lymphadenopathy. Chest  · Respiratory Effort: normal      Gastrointestinal  · Abdominal Examination: abdomen non-tender to palpation, no masses present  · Liver and spleen: no hepatomegaly present, spleen not palpable      Genitourinary  · External Genitalia: normal appearance for age, no discharge present, no tenderness present, no inflammatory lesions present, no masses present, no atrophy present  · Vagina: normal vaginal vault without central or paravaginal defects, no discharge present, no inflammatory lesions present, no masses present  · Bladder: non-tender to palpation  · Urethra: appears normal  · Cervix: normal, no cervical motion tenderness; scant menstrual blood noted; pap smear obtained  · Uterus: normal size, shape and consistency  · Adnexa: no adnexal tenderness present, no adnexal masses present  · Perineum: perineum within normal limits, no evidence of trauma, no rashes or skin lesions present  · Anus: anus within normal limits, no hemorrhoids present    Skin  · General Inspection: no rash, no lesions identified    Neurologic/Psychiatric  · Mental Status:  · Orientation: grossly oriented to person, place and time  · Mood and Affect: mood normal, affect appropriate      No results found for this visit on 03/01/22. Assessment/plan:    ICD-10-CM ICD-9-CM    1. Encounter for gynecological examination without abnormal finding  Z01.419 V72.31    2. Screening for cervical cancer  Z12.4 V76.2 PAP IG, CT-NG-TV, APTIMA HPV AND RFX 38/40,84([id number])   3. Venereal disease screening  Z11.3 V74.5 HIV 1/2 AG/AB, 4TH GENERATION,W RFLX CONFIRM      HEPATITIS C AB, RFLX TO QT BY PCR      RPR      PAP IG, CT-NG-TV, APTIMA HPV AND RFX 25/00,80([id number])   4. Encounter for breast cancer screening using non-mammogram modality  Z12.39 V76.10    5.  Intermenstrual spotting  N92.3 626.6 US PELV NON OBS        -Annual gynecologic exam.    -Cervical cancer screening- pap smear and HPV co testing obtained today. -Breast cancer screening- breast awareness discussed; mammograms beginning at age 36.    -STI screening-accepts testing: G/C/T, HIV, RPR, HCV ordered. -HPV vaccination- has been vaccinated. -Colon cancer screening- colonoscopy starting at age 39.     -Bone health-discussed weightbearing exercises, vitamin D and calcium supplementation.    -Interventions- check for GCT, Pap smear obtained, pelvic ultrasound ordered. Briefly discussed if all normal, can correct with modalities such as COCPs. -Elevated blood pressure- patient is going to contact PCP to schedule an appointment for work up.

## 2022-03-02 LAB
HCV AB S/CO SERPL IA: <0.1 S/CO RATIO (ref 0–0.9)
HCV AB SERPL QL IA: NORMAL
HIV 1+2 AB+HIV1 P24 AG SERPL QL IA: NON REACTIVE
RPR SER QL: NON REACTIVE

## 2022-03-04 LAB
C TRACH RRNA CVX QL NAA+PROBE: NEGATIVE
CYTOLOGIST CVX/VAG CYTO: NORMAL
CYTOLOGY CVX/VAG DOC CYTO: NORMAL
CYTOLOGY CVX/VAG DOC THIN PREP: NORMAL
DX ICD CODE: NORMAL
HPV I/H RISK 4 DNA CVX QL PROBE+SIG AMP: NEGATIVE
Lab: NORMAL
N GONORRHOEA RRNA CVX QL NAA+PROBE: NEGATIVE
OTHER STN SPEC: NORMAL
STAT OF ADQ CVX/VAG CYTO-IMP: NORMAL
T VAGINALIS RRNA SPEC QL NAA+PROBE: NEGATIVE

## 2022-03-10 NOTE — PROGRESS NOTES
PORTAL MESSAGE    Peter Manley,     Your pap smear came back negative. Testing for gonorrhea, chlamydia and trichomonas came back negative. Testing came back negative for HIV, Hepatitis C, screening for syphilis.     Thank you,  Dr. Ronnell Diop.

## 2022-03-19 PROBLEM — N92.3 INTERMENSTRUAL SPOTTING: Status: ACTIVE | Noted: 2022-03-01

## 2022-03-19 PROBLEM — N92.0 INTERMENSTRUAL SPOTTING: Status: ACTIVE | Noted: 2022-03-01

## 2022-03-31 PROBLEM — D25.1 INTRAMURAL LEIOMYOMA OF UTERUS: Status: ACTIVE | Noted: 2022-03-31

## 2022-03-31 RX ORDER — NORGESTIMATE AND ETHINYL ESTRADIOL 0.25-0.035
1 KIT ORAL DAILY
Qty: 3 DOSE PACK | Refills: 3 | Status: SHIPPED | OUTPATIENT
Start: 2022-03-31 | End: 2022-07-28

## 2022-04-10 LAB
17OHP SERPL-MCNC: 226 NG/DL
ESTRADIOL SERPL-MCNC: 117 PG/ML
FSH SERPL-ACNC: 4.4 MIU/ML
HCG INTACT+B SERPL-ACNC: <1 MIU/ML
LH SERPL-ACNC: 12.9 MIU/ML
PROLACTIN SERPL-MCNC: 17.5 NG/ML (ref 4.8–23.3)
TSH SERPL DL<=0.005 MIU/L-ACNC: 1.15 UIU/ML (ref 0.45–4.5)

## 2022-04-13 DIAGNOSIS — A60.9 HSV (HERPES SIMPLEX VIRUS) ANOGENITAL INFECTION: Primary | ICD-10-CM

## 2022-04-13 RX ORDER — ACYCLOVIR 50 MG/G
OINTMENT TOPICAL
Qty: 15 G | Refills: 5 | Status: SHIPPED | OUTPATIENT
Start: 2022-04-13

## 2022-05-20 ENCOUNTER — OFFICE VISIT (OUTPATIENT)
Dept: OBGYN CLINIC | Age: 32
End: 2022-05-20
Payer: MEDICAID

## 2022-05-20 VITALS
WEIGHT: 178.06 LBS | DIASTOLIC BLOOD PRESSURE: 94 MMHG | TEMPERATURE: 98.1 F | OXYGEN SATURATION: 100 % | HEART RATE: 59 BPM | SYSTOLIC BLOOD PRESSURE: 140 MMHG | HEIGHT: 59 IN | BODY MASS INDEX: 35.9 KG/M2

## 2022-05-20 DIAGNOSIS — N92.3 INTERMENSTRUAL SPOTTING: ICD-10-CM

## 2022-05-20 DIAGNOSIS — N93.9 ABNORMAL UTERINE BLEEDING (AUB): Primary | ICD-10-CM

## 2022-05-20 PROCEDURE — 99213 OFFICE O/P EST LOW 20 MIN: CPT | Performed by: OBSTETRICS & GYNECOLOGY

## 2022-05-20 RX ORDER — MEDROXYPROGESTERONE ACETATE 10 MG/1
10 TABLET ORAL DAILY
Qty: 10 TABLET | Refills: 2 | Status: SHIPPED | OUTPATIENT
Start: 2022-05-20 | End: 2022-07-28

## 2022-05-20 NOTE — PROGRESS NOTES
Juan Antonio Georges is a 28 y.o. female who presents today for the following:  Chief Complaint   Patient presents with    Vaginal Bleeding     c/o bleeding for 15 days; stopped OC's 3 days ago        No Known Allergies    Current Outpatient Medications   Medication Sig    medroxyPROGESTERone (PROVERA) 10 mg tablet Take 1 Tablet by mouth daily. Indications: abnormal uterine bleeding from imbalance of hormones    acyclovir (ZOVIRAX) 5 % ointment Apply dime sized amount for every 4inch square surface area 6 times daily (every 3 hours) for 7 days.  norgestimate-ethinyl estradioL (ORTHO-CYCLEN, SPRINTEC) 0.25-35 mg-mcg tab Take 1 Tablet by mouth daily. No current facility-administered medications for this visit. Past Medical History:   Diagnosis Date    Anxiety     Herpes simplex     Intramural leiomyoma of uterus 3/31/2022    1.2cm       Past Surgical History:   Procedure Laterality Date    HX  SECTION      HX DILATION AND CURETTAGE      HX OTHER SURGICAL      Laparoscopy salpingostomy    HX WISDOM TEETH EXTRACTION         Family History   Adopted: Yes   Family history unknown: Yes       Social History     Socioeconomic History    Marital status: SINGLE     Spouse name: Not on file    Number of children: Not on file    Years of education: Not on file    Highest education level: Associate degree: academic program   Occupational History    Occupation: medical assistant   Tobacco Use    Smoking status: Never Smoker    Smokeless tobacco: Never Used   Vaping Use    Vaping Use: Never used   Substance and Sexual Activity    Alcohol use:  Yes    Drug use: Not Currently    Sexual activity: Yes     Partners: Male     Comment: denies h/o abnml pap smears   Other Topics Concern    Not on file   Social History Narrative    Not on file     Social Determinants of Health     Financial Resource Strain:     Difficulty of Paying Living Expenses: Not on file   Food Insecurity:     Worried About Running Out of Food in the Last Year: Not on file    Ran Out of Food in the Last Year: Not on file   Transportation Needs:     Lack of Transportation (Medical): Not on file    Lack of Transportation (Non-Medical): Not on file   Physical Activity: Inactive    Days of Exercise per Week: 0 days    Minutes of Exercise per Session: 0 min   Stress:     Feeling of Stress : Not on file   Social Connections:     Frequency of Communication with Friends and Family: Not on file    Frequency of Social Gatherings with Friends and Family: Not on file    Attends Rastafarian Services: Not on file    Active Member of Clubs or Organizations: Not on file    Attends Club or Organization Meetings: Not on file    Marital Status: Not on file   Intimate Partner Violence: Not At Risk    Fear of Current or Ex-Partner: No    Emotionally Abused: No    Physically Abused: No    Sexually Abused: No   Housing Stability:     Unable to Pay for Housing in the Last Year: Not on file    Number of [place] in the Last Year: Not on file    Unstable Housing in the Last Year: Not on file         HPI  28years old patient who presented today with complaints of breakthrough bleeding. She is on oral contraception pills. ROS   Review of Systems   Constitutional: Negative. HENT: Negative. Eyes: Negative. Respiratory: Negative. Cardiovascular: Negative. Gastrointestinal: Negative. Genitourinary: Breakthrough bleeding  Musculoskeletal: Negative. Skin: Negative. Neurological: Negative. Endo/Heme/Allergies: Negative. Psychiatric/Behavioral: Negative.       BP (!) 140/94 (BP 1 Location: Left upper arm, BP Patient Position: Sitting, BP Cuff Size: Adult)   Pulse (!) 59   Temp 98.1 °F (36.7 °C) (Temporal)   Ht 4' 11\" (1.499 m)   Wt 178 lb 1 oz (80.8 kg)   LMP 05/05/2022 (Exact Date)   SpO2 100%   BMI 35.96 kg/m²    OBGyn Exam   Constitutional  · Appearance: well-nourished, well developed, alert, in no acute distress    HENT  · Head and Face: appears normal    Neck  · Inspection/Palpation: normal appearance, no masses or tenderness  · Lymph Nodes: no lymphadenopathy present  · Thyroid: gland size normal, nontender, no nodules or masses present on palpation    Chest  · Respiratory Effort: Even and unlabored  · Auscultation: normal breath sounds    Cardiovascular  · Heart:  · Auscultation: regular rate and rhythm without murmur    Gastrointestinal  · Abdominal Examination: abdomen non-tender to palpation, normal bowel sounds, no masses present  · Liver and spleen: no hepatomegaly present, spleen not palpable  · Hernias: no hernias identified    Genitourinary  · External Genitalia: normal appearance for age, no discharge present, no tenderness present, no inflammatory lesions present, no masses present, no atrophy present  · Vagina: normal vaginal vault without central or paravaginal defects, no discharge present, no inflammatory lesions present, no masses present. Menses like bleeding  · Bladder: non-tender to palpation  · Urethra: appears normal  · Cervix: normal   · Uterus: normal size, shape and consistency  · Adnexa: no adnexal tenderness present, no adnexal masses present  · Perineum: perineum within normal limits, no evidence of trauma, no rashes or skin lesions present  · Anus: anus within normal limits, no hemorrhoids present  · Inguinal Lymph Nodes: no lymphadenopathy present    Skin  · General Inspection: no rash, no lesions identified    Neurologic/Psychiatric  · Mental Status:  · Orientation: grossly oriented to person, place and time  · Mood and Affect: mood normal, affect appropriate    No results found for this visit on 05/20/22. Orders Placed This Encounter    medroxyPROGESTERone (PROVERA) 10 mg tablet     Sig: Take 1 Tablet by mouth daily. Indications: abnormal uterine bleeding from imbalance of hormones     Dispense:  10 Tablet     Refill:  2         1.  Abnormal uterine bleeding (AUB)  Patient oriented about possible treatment options for irregular bleeding (Depo-Provera, Mirena IUD, cyclic p.o. progesterone). Recent ultrasound shows a small intramural fibroid, normal endometrial stripe. In view of persistent irregular bleeding despite medical treatments (OCPs), hysteroscopic endometrial evaluation may be in order, patient oriented. Patient opted at this time for cyclic p.o. progesterone, she will think about the possibility of hysteroscopy with D&C.    2. Intermenstrual spotting    - medroxyPROGESTERone (PROVERA) 10 mg tablet; Take 1 Tablet by mouth daily. Indications: abnormal uterine bleeding from imbalance of hormones  Dispense: 10 Tablet; Refill: 2        Follow-up and Dispositions    · Return if symptoms worsen or fail to improve.

## 2022-07-28 ENCOUNTER — APPOINTMENT (OUTPATIENT)
Dept: ULTRASOUND IMAGING | Age: 32
End: 2022-07-28
Attending: FAMILY MEDICINE
Payer: MEDICAID

## 2022-07-28 ENCOUNTER — HOSPITAL ENCOUNTER (EMERGENCY)
Age: 32
Discharge: HOME | End: 2022-07-28
Attending: FAMILY MEDICINE
Payer: MEDICAID

## 2022-07-28 ENCOUNTER — APPOINTMENT (OUTPATIENT)
Dept: CT IMAGING | Age: 32
End: 2022-07-28
Attending: FAMILY MEDICINE
Payer: MEDICAID

## 2022-07-28 VITALS
SYSTOLIC BLOOD PRESSURE: 121 MMHG | HEIGHT: 59 IN | HEART RATE: 76 BPM | DIASTOLIC BLOOD PRESSURE: 66 MMHG | WEIGHT: 170 LBS | TEMPERATURE: 98.8 F | RESPIRATION RATE: 16 BRPM | OXYGEN SATURATION: 99 % | BODY MASS INDEX: 34.27 KG/M2

## 2022-07-28 DIAGNOSIS — N30.91 CYSTITIS WITH HEMATURIA: ICD-10-CM

## 2022-07-28 DIAGNOSIS — O20.0 THREATENED ABORTION: ICD-10-CM

## 2022-07-28 DIAGNOSIS — Z32.01 POSITIVE PREGNANCY TEST: Primary | ICD-10-CM

## 2022-07-28 LAB
ALBUMIN SERPL-MCNC: 4.2 G/DL (ref 3.5–5)
ALBUMIN/GLOB SERPL: 1.3 {RATIO} (ref 1.1–2.2)
ALP SERPL-CCNC: 53 U/L (ref 45–117)
ALT SERPL-CCNC: 19 U/L (ref 12–78)
AMORPH CRY URNS QL MICRO: ABNORMAL
ANION GAP SERPL CALC-SCNC: 7 MMOL/L (ref 5–15)
APPEARANCE UR: CLEAR
AST SERPL W P-5'-P-CCNC: 19 U/L (ref 15–37)
BACTERIA URNS QL MICRO: ABNORMAL /HPF
BASOPHILS # BLD: 0.1 K/UL (ref 0–0.1)
BASOPHILS NFR BLD: 1 % (ref 0–1)
BILIRUB SERPL-MCNC: 0.6 MG/DL (ref 0.2–1)
BILIRUB UR QL: NEGATIVE
BUN SERPL-MCNC: 10 MG/DL (ref 6–20)
BUN/CREAT SERPL: 10 (ref 12–20)
CA-I BLD-MCNC: 9.1 MG/DL (ref 8.5–10.1)
CHLORIDE SERPL-SCNC: 102 MMOL/L (ref 97–108)
CO2 SERPL-SCNC: 27 MMOL/L (ref 21–32)
COLOR UR: YELLOW
CREAT SERPL-MCNC: 0.97 MG/DL (ref 0.55–1.02)
DIFFERENTIAL METHOD BLD: ABNORMAL
EOSINOPHIL # BLD: 0 K/UL (ref 0–0.4)
EOSINOPHIL NFR BLD: 0 % (ref 0–7)
EPITH CASTS URNS QL MICRO: ABNORMAL /LPF
ERYTHROCYTE [DISTWIDTH] IN BLOOD BY AUTOMATED COUNT: 12.6 % (ref 11.5–14.5)
GLOBULIN SER CALC-MCNC: 3.3 G/DL (ref 2–4)
GLUCOSE SERPL-MCNC: 97 MG/DL (ref 65–100)
GLUCOSE UR STRIP.AUTO-MCNC: NEGATIVE MG/DL
HCG SERPL-ACNC: 1014.1 MIU/ML (ref 0–6)
HCG UR QL: POSITIVE
HCT VFR BLD AUTO: 34 % (ref 35–47)
HGB BLD-MCNC: 11.6 G/DL (ref 11.5–16)
HGB UR QL STRIP: ABNORMAL
IMM GRANULOCYTES # BLD AUTO: 0 K/UL (ref 0–0.04)
IMM GRANULOCYTES NFR BLD AUTO: 0 % (ref 0–0.5)
KETONES UR QL STRIP.AUTO: 15 MG/DL
LEUKOCYTE ESTERASE UR QL STRIP.AUTO: NEGATIVE
LIPASE SERPL-CCNC: 81 U/L (ref 73–393)
LYMPHOCYTES # BLD: 2 K/UL (ref 0.8–3.5)
LYMPHOCYTES NFR BLD: 26 % (ref 12–49)
MCH RBC QN AUTO: 30.3 PG (ref 26–34)
MCHC RBC AUTO-ENTMCNC: 34.1 G/DL (ref 30–36.5)
MCV RBC AUTO: 88.8 FL (ref 80–99)
MONOCYTES # BLD: 0.6 K/UL (ref 0–1)
MONOCYTES NFR BLD: 7 % (ref 5–13)
NEUTS SEG # BLD: 4.9 K/UL (ref 1.8–8)
NEUTS SEG NFR BLD: 66 % (ref 32–75)
NITRITE UR QL STRIP.AUTO: NEGATIVE
PH UR STRIP: >8.5 [PH] (ref 5–8)
PLATELET # BLD AUTO: 413 K/UL (ref 150–400)
PMV BLD AUTO: 9.5 FL (ref 8.9–12.9)
POTASSIUM SERPL-SCNC: 3.8 MMOL/L (ref 3.5–5.1)
PROT SERPL-MCNC: 7.5 G/DL (ref 6.4–8.2)
PROT UR STRIP-MCNC: NEGATIVE MG/DL
RBC # BLD AUTO: 3.83 M/UL (ref 3.8–5.2)
RBC #/AREA URNS HPF: ABNORMAL /HPF (ref 0–5)
SODIUM SERPL-SCNC: 136 MMOL/L (ref 136–145)
SP GR UR REFRACTOMETRY: 1.01 (ref 1–1.03)
UROBILINOGEN UR QL STRIP.AUTO: 0.1 EU/DL (ref 0.2–1)
WBC # BLD AUTO: 7.5 K/UL (ref 3.6–11)
WBC URNS QL MICRO: ABNORMAL /HPF (ref 0–4)

## 2022-07-28 PROCEDURE — 74011250636 HC RX REV CODE- 250/636: Performed by: FAMILY MEDICINE

## 2022-07-28 PROCEDURE — 96374 THER/PROPH/DIAG INJ IV PUSH: CPT

## 2022-07-28 PROCEDURE — 81001 URINALYSIS AUTO W/SCOPE: CPT

## 2022-07-28 PROCEDURE — 76817 TRANSVAGINAL US OBSTETRIC: CPT

## 2022-07-28 PROCEDURE — 85025 COMPLETE CBC W/AUTO DIFF WBC: CPT

## 2022-07-28 PROCEDURE — 99284 EMERGENCY DEPT VISIT MOD MDM: CPT

## 2022-07-28 PROCEDURE — 80053 COMPREHEN METABOLIC PANEL: CPT

## 2022-07-28 PROCEDURE — 81025 URINE PREGNANCY TEST: CPT

## 2022-07-28 PROCEDURE — 84702 CHORIONIC GONADOTROPIN TEST: CPT

## 2022-07-28 PROCEDURE — 74011250637 HC RX REV CODE- 250/637: Performed by: FAMILY MEDICINE

## 2022-07-28 PROCEDURE — 83690 ASSAY OF LIPASE: CPT

## 2022-07-28 RX ORDER — GRANULES FOR ORAL 3 G/1
3 POWDER ORAL ONCE
Status: COMPLETED | OUTPATIENT
Start: 2022-07-28 | End: 2022-07-28

## 2022-07-28 RX ORDER — ONDANSETRON 2 MG/ML
4 INJECTION INTRAMUSCULAR; INTRAVENOUS
Status: COMPLETED | OUTPATIENT
Start: 2022-07-28 | End: 2022-07-28

## 2022-07-28 RX ORDER — KETOROLAC TROMETHAMINE 30 MG/ML
30 INJECTION, SOLUTION INTRAMUSCULAR; INTRAVENOUS
Status: DISCONTINUED | OUTPATIENT
Start: 2022-07-28 | End: 2022-07-28

## 2022-07-28 RX ADMIN — GRANULES FOR ORAL SOLUTION 3 G: 3 POWDER ORAL at 19:09

## 2022-07-28 RX ADMIN — ONDANSETRON 4 MG: 2 INJECTION INTRAMUSCULAR; INTRAVENOUS at 17:32

## 2022-07-28 NOTE — ED TRIAGE NOTES
Started 1 hr ago, sudden onset bilat lower abd cramping 10/10 constant. Nothing makes better and BM makes worse, Started period Tues, lighter bleeding this time than usual, going though 1-2 pads per day. feels pressure lower abd when voiding

## 2022-07-28 NOTE — Clinical Note
[address]  [address] [facility] [address]  [phone]    Work/School Note    Date: 7/28/2022    To Whom It May concern:    Heber Fernandez was seen and treated today in the emergency room by the following provider(s):  Attending Provider: Pranav Collins DO. Heber Fernandez is excused from work/school on 07/28/22 and 07/29/22. She is medically clear to return to work/school on 7/30/2022.        Sincerely,          Johanna Cramer MD

## 2022-07-28 NOTE — DISCHARGE INSTRUCTIONS
Thank you! Thank you for allowing me to care for you in the emergency department. It is my goal to provide you with excellent care. If you have not received excellent quality care, please ask to speak to the nurse manager. Please fill out the survey that will come to you by mail or email since we listen to your feedback! Below you will find a list of your tests from today's visit. Should you have any questions, please do not hesitate to call the emergency department. Labs  Recent Results (from the past 12 hour(s))   URINALYSIS W/ RFLX MICROSCOPIC    Collection Time: 07/28/22  4:15 PM   Result Value Ref Range    Color Yellow      Appearance Clear Clear      Specific gravity 1.015 1.003 - 1.030      pH (UA) >8.5 (H) 5.0 - 8.0    Protein Negative Negative mg/dL    Glucose Negative Negative mg/dL    Ketone 15 (A) Negative mg/dL    Bilirubin Negative Negative      Blood Large (A) Negative      Urobilinogen 0.1 (L) 0.2 - 1.0 EU/dL    Nitrites Negative Negative      Leukocyte Esterase Negative Negative     URINE MICROSCOPIC    Collection Time: 07/28/22  4:15 PM   Result Value Ref Range    WBC 0-4 0 - 4 /hpf    RBC 20-50 0 - 5 /hpf    Epithelial cells Few Few /lpf    Bacteria 2+ (A) Negative /hpf    Amorphous Crystals 3+ (A) Negative   CBC WITH AUTOMATED DIFF    Collection Time: 07/28/22  5:15 PM   Result Value Ref Range    WBC 7.5 3.6 - 11.0 K/uL    RBC 3.83 3.80 - 5.20 M/uL    HGB 11.6 11.5 - 16.0 g/dL    HCT 34.0 (L) 35.0 - 47.0 %    MCV 88.8 80.0 - 99.0 FL    MCH 30.3 26.0 - 34.0 PG    MCHC 34.1 30.0 - 36.5 g/dL    RDW 12.6 11.5 - 14.5 %    PLATELET 018 (H) 359 - 400 K/uL    MPV 9.5 8.9 - 12.9 FL    NEUTROPHILS 66 32 - 75 %    LYMPHOCYTES 26 12 - 49 %    MONOCYTES 7 5 - 13 %    EOSINOPHILS 0 0 - 7 %    BASOPHILS 1 0 - 1 %    IMMATURE GRANULOCYTES 0 0.0 - 0.5 %    ABS. NEUTROPHILS 4.9 1.8 - 8.0 K/UL    ABS. LYMPHOCYTES 2.0 0.8 - 3.5 K/UL    ABS. MONOCYTES 0.6 0.0 - 1.0 K/UL    ABS.  EOSINOPHILS 0.0 0.0 - 0.4 K/UL    ABS. BASOPHILS 0.1 0.0 - 0.1 K/UL    ABS. IMM. GRANS. 0.0 0.00 - 0.04 K/UL    DF AUTOMATED     METABOLIC PANEL, COMPREHENSIVE    Collection Time: 07/28/22  5:15 PM   Result Value Ref Range    Sodium 136 136 - 145 mmol/L    Potassium 3.8 3.5 - 5.1 mmol/L    Chloride 102 97 - 108 mmol/L    CO2 27 21 - 32 mmol/L    Anion gap 7 5 - 15 mmol/L    Glucose 97 65 - 100 mg/dL    BUN 10 6 - 20 mg/dL    Creatinine 0.97 0.55 - 1.02 mg/dL    BUN/Creatinine ratio 10 (L) 12 - 20      GFR est AA >60 >60 ml/min/1.73m2    GFR est non-AA >60 >60 ml/min/1.73m2    Calcium 9.1 8.5 - 10.1 mg/dL    Bilirubin, total 0.6 0.2 - 1.0 mg/dL    AST (SGOT) 19 15 - 37 U/L    ALT (SGPT) 19 12 - 78 U/L    Alk. phosphatase 53 45 - 117 U/L    Protein, total 7.5 6.4 - 8.2 g/dL    Albumin 4.2 3.5 - 5.0 g/dL    Globulin 3.3 2.0 - 4.0 g/dL    A-G Ratio 1.3 1.1 - 2.2     LIPASE    Collection Time: 07/28/22  5:15 PM   Result Value Ref Range    Lipase 81 73 - 393 U/L   HCG URINE, QL    Collection Time: 07/28/22  5:30 PM   Result Value Ref Range    HCG urine, QL Positive (A) Negative         Radiologic Studies  US UTS TRANSVAGINAL OB   Final Result   1. . Transvaginal pelvic ultrasound revealing unremarkable uterus with no   intrauterine gestation. Right ovarian small cystic mass. Left ovary not seen. Small free fluid. CT Results  (Last 48 hours)      None          CXR Results  (Last 48 hours)      None          ------------------------------------------------------------------------------------------------------------  The exam and treatment you received in the Emergency Department were for an urgent problem and are not intended as complete care. It is important that you follow-up with a doctor, nurse practitioner, or physician assistant to:  (1) confirm your diagnosis,  (2) re-evaluation of changes in your illness and treatment, and  (3) for ongoing care.  Please take your discharge instructions with you when you go to your follow-up appointment. If you have any problem arranging a follow-up appointment, contact the Emergency Department. If your symptoms become worse or you do not improve as expected and you are unable to reach your health care provider, please return to the Emergency Department. We are available 24 hours a day. If a prescription has been provided, please have it filled as soon as possible to prevent a delay in treatment. If you have any questions or reservations about taking the medication due to side effects or interactions with other medications, please call your primary care provider or contact the ER.

## 2022-07-28 NOTE — ED PROVIDER NOTES
EMERGENCY DEPARTMENT HISTORY AND PHYSICAL EXAM      Date: 2022  Patient Name: Elvin Kline    History of Presenting Illness     Chief Complaint   Patient presents with    Abdominal Pain       History Provided By:     HPI: Elvin Kline, is a very pleasant 28 y.o. female presenting to the ED with a chief complaint of abdominal pain. Recent onset of symptoms approximately 1 hour ago. Pain is in the lower abdomen and radiates into the groin area. Pain was a 10 out of 10 but waxing and waning. Patient states she believes she started her menses today. Prior to that her menses was around . Patient states she is also having uncomfortable sensation with urination. No flank pain. No fevers chills nor rigors. The patient denies any other symptoms at this time. PCP: None    No current facility-administered medications on file prior to encounter. Current Outpatient Medications on File Prior to Encounter   Medication Sig Dispense Refill    [DISCONTINUED] medroxyPROGESTERone (PROVERA) 10 mg tablet Take 1 Tablet by mouth daily. Indications: abnormal uterine bleeding from imbalance of hormones 10 Tablet 2    acyclovir (ZOVIRAX) 5 % ointment Apply dime sized amount for every 4inch square surface area 6 times daily (every 3 hours) for 7 days. 15 g 5    [DISCONTINUED] norgestimate-ethinyl estradioL (ORTHO-CYCLEN, SPRINTEC) 0.25-35 mg-mcg tab Take 1 Tablet by mouth daily.  3 Dose Pack 3       Past History     Past Medical History:  Past Medical History:   Diagnosis Date    Anxiety     Herpes simplex     Intramural leiomyoma of uterus 3/31/2022    1.2cm       Past Surgical History:  Past Surgical History:   Procedure Laterality Date    HX  SECTION      HX DILATION AND CURETTAGE      HX OTHER SURGICAL      Laparoscopy salpingostomy    HX WISDOM TEETH EXTRACTION         Family History:  Family History   Adopted: Yes   Family history unknown: Yes       Social History:  Social History     Tobacco Use    Smoking status: Never    Smokeless tobacco: Never   Vaping Use    Vaping Use: Never used   Substance Use Topics    Alcohol use: Not Currently    Drug use: Not Currently       Allergies:  No Known Allergies      Review of Systems     Review of Systems   Constitutional:  Negative for activity change, appetite change, chills, fatigue and fever. HENT:  Negative for congestion and sore throat. Eyes:  Negative for photophobia and visual disturbance. Respiratory:  Negative for cough, shortness of breath and wheezing. Cardiovascular:  Negative for chest pain, palpitations and leg swelling. Gastrointestinal:  Positive for abdominal pain. Negative for diarrhea, nausea and vomiting. Endocrine: Negative for cold intolerance and heat intolerance. Musculoskeletal:  Negative for gait problem and joint swelling. Skin:  Negative for color change and rash. Neurological:  Negative for dizziness and headaches. Physical Exam     Physical Exam  Constitutional:       Appearance: She is well-developed. HENT:      Head: Normocephalic and atraumatic. Mouth/Throat:      Mouth: Mucous membranes are moist.      Pharynx: Oropharynx is clear. Eyes:      Conjunctiva/sclera: Conjunctivae normal.      Pupils: Pupils are equal, round, and reactive to light. Cardiovascular:      Rate and Rhythm: Normal rate and regular rhythm. Heart sounds: No murmur heard. Pulmonary:      Effort: No respiratory distress. Breath sounds: No stridor. No wheezing, rhonchi or rales. Abdominal:      General: There is no distension. Tenderness: There is no rebound. Comments: Suprapubic tenderness with palpation   Musculoskeletal:      Cervical back: Normal range of motion and neck supple. Skin:     General: Skin is warm and dry. Neurological:      General: No focal deficit present. Mental Status: She is alert and oriented to person, place, and time.    Psychiatric:         Mood and Affect: Mood normal.         Behavior: Behavior normal.       Lab and Diagnostic Study Results     Labs -     Recent Results (from the past 12 hour(s))   URINALYSIS W/ RFLX MICROSCOPIC    Collection Time: 07/28/22  4:15 PM   Result Value Ref Range    Color Yellow      Appearance Clear Clear      Specific gravity 1.015 1.003 - 1.030      pH (UA) >8.5 (H) 5.0 - 8.0    Protein Negative Negative mg/dL    Glucose Negative Negative mg/dL    Ketone 15 (A) Negative mg/dL    Bilirubin Negative Negative      Blood Large (A) Negative      Urobilinogen 0.1 (L) 0.2 - 1.0 EU/dL    Nitrites Negative Negative      Leukocyte Esterase Negative Negative     URINE MICROSCOPIC    Collection Time: 07/28/22  4:15 PM   Result Value Ref Range    WBC 0-4 0 - 4 /hpf    RBC 20-50 0 - 5 /hpf    Epithelial cells Few Few /lpf    Bacteria 2+ (A) Negative /hpf    Amorphous Crystals 3+ (A) Negative   CBC WITH AUTOMATED DIFF    Collection Time: 07/28/22  5:15 PM   Result Value Ref Range    WBC 7.5 3.6 - 11.0 K/uL    RBC 3.83 3.80 - 5.20 M/uL    HGB 11.6 11.5 - 16.0 g/dL    HCT 34.0 (L) 35.0 - 47.0 %    MCV 88.8 80.0 - 99.0 FL    MCH 30.3 26.0 - 34.0 PG    MCHC 34.1 30.0 - 36.5 g/dL    RDW 12.6 11.5 - 14.5 %    PLATELET 241 (H) 227 - 400 K/uL    MPV 9.5 8.9 - 12.9 FL    NEUTROPHILS 66 32 - 75 %    LYMPHOCYTES 26 12 - 49 %    MONOCYTES 7 5 - 13 %    EOSINOPHILS 0 0 - 7 %    BASOPHILS 1 0 - 1 %    IMMATURE GRANULOCYTES 0 0.0 - 0.5 %    ABS. NEUTROPHILS 4.9 1.8 - 8.0 K/UL    ABS. LYMPHOCYTES 2.0 0.8 - 3.5 K/UL    ABS. MONOCYTES 0.6 0.0 - 1.0 K/UL    ABS. EOSINOPHILS 0.0 0.0 - 0.4 K/UL    ABS. BASOPHILS 0.1 0.0 - 0.1 K/UL    ABS. IMM.  GRANS. 0.0 0.00 - 0.04 K/UL    DF AUTOMATED     METABOLIC PANEL, COMPREHENSIVE    Collection Time: 07/28/22  5:15 PM   Result Value Ref Range    Sodium 136 136 - 145 mmol/L    Potassium 3.8 3.5 - 5.1 mmol/L    Chloride 102 97 - 108 mmol/L    CO2 27 21 - 32 mmol/L    Anion gap 7 5 - 15 mmol/L    Glucose 97 65 - 100 mg/dL BUN 10 6 - 20 mg/dL    Creatinine 0.97 0.55 - 1.02 mg/dL    BUN/Creatinine ratio 10 (L) 12 - 20      GFR est AA >60 >60 ml/min/1.73m2    GFR est non-AA >60 >60 ml/min/1.73m2    Calcium 9.1 8.5 - 10.1 mg/dL    Bilirubin, total 0.6 0.2 - 1.0 mg/dL    AST (SGOT) 19 15 - 37 U/L    ALT (SGPT) 19 12 - 78 U/L    Alk. phosphatase 53 45 - 117 U/L    Protein, total 7.5 6.4 - 8.2 g/dL    Albumin 4.2 3.5 - 5.0 g/dL    Globulin 3.3 2.0 - 4.0 g/dL    A-G Ratio 1.3 1.1 - 2.2     LIPASE    Collection Time: 07/28/22  5:15 PM   Result Value Ref Range    Lipase 81 73 - 393 U/L   HCG URINE, QL    Collection Time: 07/28/22  5:30 PM   Result Value Ref Range    HCG urine, QL Positive (A) Negative         Radiologic Studies -   @[email or web address]@  CT Results  (Last 48 hours)      None          CXR Results  (Last 48 hours)      None              Medical Decision Making   - I am the first provider for this patient. - I reviewed the vital signs, available nursing notes, past medical history, past surgical history, family history and social history. - Initial assessment performed. The patients presenting problems have been discussed, and they are in agreement with the care plan formulated and outlined with them. I have encouraged them to ask questions as they arise throughout their visit. Vital Signs-Reviewed the patient's vital signs. Patient Vitals for the past 12 hrs:   Temp Pulse Resp BP SpO2   07/28/22 1847 -- 72 16 124/61 99 %   07/28/22 1600 98.8 °F (37.1 °C) 76 18 (!) 147/87 100 %         ED Course/ Provider Notes (Medical Decision Making):     Patient presented to the emergency department with the aforementioned chief complaint. On examination the patient is nontoxic. Vitals were reviewed per above. She is afebrile. No leukocytosis. Urinalysis consistent with UTI. Beta hCG quant ordered and pending at the time of this dictation. Transvaginal ultrasound ordered and reviewed.   No intrauterine pregnancy visualized however suspect early pregnancy. Beta hCG quant ordered and pending. Patient would like to wait for hCG quant to result prior to discharge. Patient understands regardless she will need close follow-up with her OB/GYN within 24 to 48 hours. Case discussed with Dr. Josefina Chew  who will be caring for this patient at shift change. Jayleen Segura was given a thorough list of signs and symptoms that would warrant an immediate return to the emergency department. Otherwise Jayleen Segura will follow up with PCP and OB/GYN. Procedures   Medical Decision Makingedical Decision Making  Performed by: Chavez Jimenez DO  Procedures  None       Disposition   Disposition:     Home     All of the diagnostic tests were reviewed and questions answered. Diagnosis, care plan and treatment options were discussed. The patient understands the instructions and will follow up as directed. The patients results have been reviewed with them. They have been counseled regarding their diagnosis. The patient verbally convey understanding and agreement of the signs, symptoms, diagnosis, treatment and prognosis and additionally agrees to follow up as recommended with their PCP in 24 - 48 hours. They also agree with the care-plan and convey that all of their questions have been answered. I have also put together some discharge instructions for them that include: 1) educational information regarding their diagnosis, 2) how to care for their diagnosis at home, as well a 3) list of reasons why they would want to return to the ED prior to their follow-up appointment, should their condition change. DISCHARGE PLAN:    1. Current Discharge Medication List        CONTINUE these medications which have NOT CHANGED    Details   acyclovir (ZOVIRAX) 5 % ointment Apply dime sized amount for every 4inch square surface area 6 times daily (every 3 hours) for 7 days.   Qty: 15 g, Refills: 5    Associated Diagnoses: HSV (herpes simplex virus) anogenital infection               2.   Follow-up Information       Follow up With Specialties Details Why [address] [facility] Obstetrics & Gynecology Schedule an appointment as soon as possible for a visit   [facility] [place] [id number] [phone]    Your primary care doctor  Schedule an appointment as soon as possible for a visit in 1 day                3.  Return to ED if worse       4. Current Discharge Medication List            Diagnosis     Clinical Impression:    1. Positive pregnancy test    2. Cystitis with hematuria        Attestations:    Mata Eddy, DO    Please note that this dictation was completed with Server[employer], the computer voice recognition software. Quite often unanticipated grammatical, syntax, homophones, and other interpretive errors are inadvertently transcribed by the computer software. Please disregard these errors. Please excuse any errors that have escaped final proofreading. Thank you.

## 2022-08-02 ENCOUNTER — HOSPITAL ENCOUNTER (EMERGENCY)
Age: 32
Discharge: HOME | End: 2022-08-02
Attending: STUDENT IN AN ORGANIZED HEALTH CARE EDUCATION/TRAINING PROGRAM
Payer: MEDICAID

## 2022-08-02 VITALS
RESPIRATION RATE: 18 BRPM | BODY MASS INDEX: 34.27 KG/M2 | TEMPERATURE: 98.5 F | OXYGEN SATURATION: 100 % | HEIGHT: 59 IN | DIASTOLIC BLOOD PRESSURE: 89 MMHG | SYSTOLIC BLOOD PRESSURE: 149 MMHG | WEIGHT: 170 LBS | HEART RATE: 83 BPM

## 2022-08-02 DIAGNOSIS — O03.9 MISCARRIAGE: Primary | ICD-10-CM

## 2022-08-02 DIAGNOSIS — N30.90 CYSTITIS: ICD-10-CM

## 2022-08-02 DIAGNOSIS — Z67.40 TYPE O BLOOD, RH POSITIVE: ICD-10-CM

## 2022-08-02 LAB
ABO + RH BLD: NORMAL
ALBUMIN SERPL-MCNC: 3.9 G/DL (ref 3.5–5)
ALBUMIN/GLOB SERPL: 1.1 {RATIO} (ref 1.1–2.2)
ANION GAP SERPL CALC-SCNC: 11 MMOL/L (ref 5–15)
APPEARANCE UR: ABNORMAL
BACTERIA URNS QL MICRO: ABNORMAL /HPF
BASOPHILS # BLD: 0 K/UL (ref 0–0.1)
BASOPHILS NFR BLD: 1 % (ref 0–1)
BILIRUB SERPL-MCNC: 0.7 MG/DL (ref 0.2–1)
BILIRUB UR QL: NEGATIVE
BLOOD GROUP ANTIBODIES SERPL: NEGATIVE
BUN SERPL-MCNC: 8 MG/DL (ref 6–20)
BUN/CREAT SERPL: 9 (ref 12–20)
CA-I BLD-MCNC: 8.5 MG/DL (ref 8.5–10.1)
CHLORIDE SERPL-SCNC: 101 MMOL/L (ref 97–108)
CO2 SERPL-SCNC: 26 MMOL/L (ref 21–32)
COLOR UR: ABNORMAL
CREAT SERPL-MCNC: 0.89 MG/DL (ref 0.55–1.02)
DIFFERENTIAL METHOD BLD: ABNORMAL
EOSINOPHIL # BLD: 0 K/UL (ref 0–0.4)
EOSINOPHIL NFR BLD: 0 % (ref 0–7)
EPITH CASTS URNS QL MICRO: ABNORMAL /LPF
ERYTHROCYTE [DISTWIDTH] IN BLOOD BY AUTOMATED COUNT: 12.9 % (ref 11.5–14.5)
GLOBULIN SER CALC-MCNC: 3.7 G/DL (ref 2–4)
GLUCOSE SERPL-MCNC: 89 MG/DL (ref 65–100)
GLUCOSE UR STRIP.AUTO-MCNC: NEGATIVE MG/DL
HCG SERPL-ACNC: 201 MIU/ML (ref 0–6)
HCG UR QL: POSITIVE
HCT VFR BLD AUTO: 32.1 % (ref 35–47)
HGB BLD-MCNC: 11 G/DL (ref 11.5–16)
HGB UR QL STRIP: ABNORMAL
IMM GRANULOCYTES # BLD AUTO: 0 K/UL (ref 0–0.04)
IMM GRANULOCYTES NFR BLD AUTO: 0 % (ref 0–0.5)
KETONES UR QL STRIP.AUTO: 15 MG/DL
LEUKOCYTE ESTERASE UR QL STRIP.AUTO: ABNORMAL
LYMPHOCYTES # BLD: 1.1 K/UL (ref 0.8–3.5)
LYMPHOCYTES NFR BLD: 27 % (ref 12–49)
MCH RBC QN AUTO: 30.9 PG (ref 26–34)
MCHC RBC AUTO-ENTMCNC: 34.3 G/DL (ref 30–36.5)
MCV RBC AUTO: 90.2 FL (ref 80–99)
MONOCYTES # BLD: 0.7 K/UL (ref 0–1)
MONOCYTES NFR BLD: 17 % (ref 5–13)
NEUTS SEG # BLD: 2.2 K/UL (ref 1.8–8)
NEUTS SEG NFR BLD: 55 % (ref 32–75)
NITRITE UR QL STRIP.AUTO: POSITIVE
PH UR STRIP: 5 [PH] (ref 5–8)
PLATELET # BLD AUTO: 374 K/UL (ref 150–400)
PMV BLD AUTO: 9.7 FL (ref 8.9–12.9)
POTASSIUM SERPL-SCNC: 3.6 MMOL/L (ref 3.5–5.1)
PROT SERPL-MCNC: 7.6 G/DL (ref 6.4–8.2)
PROT UR STRIP-MCNC: >300 MG/DL
RBC # BLD AUTO: 3.56 M/UL (ref 3.8–5.2)
RBC #/AREA URNS HPF: >100 /HPF (ref 0–5)
SODIUM SERPL-SCNC: 138 MMOL/L (ref 136–145)
SP GR UR REFRACTOMETRY: 1.02 (ref 1–1.03)
SPECIMEN EXP DATE BLD: NORMAL
UA: UC IF INDICATED,UAUC: ABNORMAL
UROBILINOGEN UR QL STRIP.AUTO: 4 EU/DL (ref 0.2–1)
WBC # BLD AUTO: 3.9 K/UL (ref 3.6–11)
WBC URNS QL MICRO: ABNORMAL /HPF (ref 0–4)

## 2022-08-02 PROCEDURE — 84702 CHORIONIC GONADOTROPIN TEST: CPT

## 2022-08-02 PROCEDURE — 81025 URINE PREGNANCY TEST: CPT

## 2022-08-02 PROCEDURE — 81001 URINALYSIS AUTO W/SCOPE: CPT

## 2022-08-02 PROCEDURE — 99284 EMERGENCY DEPT VISIT MOD MDM: CPT

## 2022-08-02 PROCEDURE — 74011250636 HC RX REV CODE- 250/636: Performed by: STUDENT IN AN ORGANIZED HEALTH CARE EDUCATION/TRAINING PROGRAM

## 2022-08-02 PROCEDURE — 87086 URINE CULTURE/COLONY COUNT: CPT

## 2022-08-02 PROCEDURE — 85025 COMPLETE CBC W/AUTO DIFF WBC: CPT

## 2022-08-02 PROCEDURE — 80053 COMPREHEN METABOLIC PANEL: CPT

## 2022-08-02 PROCEDURE — 86900 BLOOD TYPING SEROLOGIC ABO: CPT

## 2022-08-02 RX ORDER — CEPHALEXIN 500 MG/1
500 CAPSULE ORAL 4 TIMES DAILY
Qty: 28 CAPSULE | Refills: 0 | Status: SHIPPED | OUTPATIENT
Start: 2022-08-02 | End: 2022-08-09

## 2022-08-02 RX ADMIN — SODIUM CHLORIDE 1000 ML: 9 INJECTION, SOLUTION INTRAVENOUS at 09:53

## 2022-08-02 NOTE — ED TRIAGE NOTES
Pt found out she was pregnant last Thursday. Noticed large blood clot this morning. Concern for miscarriage.

## 2022-08-02 NOTE — DISCHARGE INSTRUCTIONS
Thank you! Thank you for allowing me to care for you in the emergency department. I sincerely hope that you are satisfied with your visit today. It is my goal to provide you with excellent care. Below you will find a list of your labs and imaging from your visit today if applicable. Should you have any questions regarding these results please do not hesitate to call the emergency department. Please review [employer] for a more detailed result list since the below list may not be comprehensive. Instructions on how to sign up to [employer] should be provided in this packet.     Labs -     Recent Results (from the past 12 hour(s))   URINALYSIS W/ REFLEX CULTURE    Collection Time: 08/02/22  9:30 AM    Specimen: Urine   Result Value Ref Range    Color Red      Appearance Cloudy (A) Clear      Specific gravity 1.025 1.003 - 1.030      pH (UA) 5.0 5.0 - 8.0      Protein >300 (A) Negative mg/dL    Glucose Negative Negative mg/dL    Ketone 15 (A) Negative mg/dL    Bilirubin Negative Negative      Blood Large (A) Negative      Urobilinogen 4.0 (H) 0.2 - 1.0 EU/dL    Nitrites Positive (A) Negative      Leukocyte Esterase Small (A) Negative      WBC 20-50 0 - 4 /hpf    RBC >100 (H) 0 - 5 /hpf    Epithelial cells Moderate (A) Few /lpf    Bacteria 2+ (A) Negative /hpf    UA:UC IF INDICATED Urine Culture Ordered (A) Culture not indicated by UA result     HCG URINE, QL    Collection Time: 08/02/22  9:30 AM   Result Value Ref Range    HCG urine, QL Positive (A) Negative     CBC WITH AUTOMATED DIFF    Collection Time: 08/02/22  9:55 AM   Result Value Ref Range    WBC 3.9 3.6 - 11.0 K/uL    RBC 3.56 (L) 3.80 - 5.20 M/uL    HGB 11.0 (L) 11.5 - 16.0 g/dL    HCT 32.1 (L) 35.0 - 47.0 %    MCV 90.2 80.0 - 99.0 FL    MCH 30.9 26.0 - 34.0 PG    MCHC 34.3 30.0 - 36.5 g/dL    RDW 12.9 11.5 - 14.5 %    PLATELET 787 885 - 770 K/uL    MPV 9.7 8.9 - 12.9 FL    NEUTROPHILS 55 32 - 75 %    LYMPHOCYTES 27 12 - 49 %    MONOCYTES 17 (H) 5 - 13 % EOSINOPHILS 0 0 - 7 %    BASOPHILS 1 0 - 1 %    IMMATURE GRANULOCYTES 0 0.0 - 0.5 %    ABS. NEUTROPHILS 2.2 1.8 - 8.0 K/UL    ABS. LYMPHOCYTES 1.1 0.8 - 3.5 K/UL    ABS. MONOCYTES 0.7 0.0 - 1.0 K/UL    ABS. EOSINOPHILS 0.0 0.0 - 0.4 K/UL    ABS. BASOPHILS 0.0 0.0 - 0.1 K/UL    ABS. IMM. GRANS. 0.0 0.00 - 0.04 K/UL    DF AUTOMATED     METABOLIC PANEL, COMPREHENSIVE    Collection Time: 08/02/22  9:55 AM   Result Value Ref Range    Sodium 138 136 - 145 mmol/L    Potassium 3.6 3.5 - 5.1 mmol/L    Chloride 101 97 - 108 mmol/L    CO2 26 21 - 32 mmol/L    Anion gap 11 5 - 15 mmol/L    Glucose 89 65 - 100 mg/dL    BUN 8 6 - 20 mg/dL    Creatinine 0.89 0.55 - 1.02 mg/dL    BUN/Creatinine ratio 9 (L) 12 - 20      GFR est AA >60 >60 ml/min/1.73m2    GFR est non-AA >60 >60 ml/min/1.73m2    Calcium 8.5 8.5 - 10.1 mg/dL    Bilirubin, total 0.7 0.2 - 1.0 mg/dL    Protein, total 7.6 6.4 - 8.2 g/dL    Albumin 3.9 3.5 - 5.0 g/dL    Globulin 3.7 2.0 - 4.0 g/dL    A-G Ratio 1.1 1.1 - 2.2     TYPE & SCREEN    Collection Time: 08/02/22  9:55 AM   Result Value Ref Range    Crossmatch Expiration 08/05/2022,2359     ABO/Rh(D) Johnson Bear Positive     Antibody screen Negative    BETA HCG, QT    Collection Time: 08/02/22  9:55 AM   Result Value Ref Range    Beta HCG, .0 (H) 0 - 6 mIU/mL       Radiologic Studies -   No orders to display     CT Results  (Last 48 hours)      None          CXR Results  (Last 48 hours)      None               If you feel that you have not received excellent quality care or timely care, please ask to speak to the nurse manager. Please choose us in the future for your continued health care needs. ------------------------------------------------------------------------------------------------------------  The exam and treatment you received in the Emergency Department were for an urgent problem and are not intended as complete care.  It is important that you follow-up with a doctor, nurse practitioner, or physician assistant to:  (1) confirm your diagnosis,  (2) re-evaluation of changes in your illness and treatment, and  (3) for ongoing care. If your symptoms become worse or you do not improve as expected and you are unable to reach your usual health care provider, you should return to the Emergency Department. We are available 24 hours a day. Please take your discharge instructions with you when you go to your follow-up appointment. If a prescription has been provided, please have it filled as soon as possible to prevent a delay in treatment. Read the entire medication instruction sheet provided to you by the pharmacy. If you have any questions or reservations about taking the medication due to side effects or interactions with other medications, please call your primary care physician or contact the ER to speak with the charge nurse. Make an appointment with your family doctor or the physician you were referred to for follow-up of this visit as instructed on your discharge paperwork, as this is a mandatory follow-up. Return to the ER if you are unable to be seen or if you are unable to be seen in a timely manner. If you have any problem arranging the follow-up visit, contact the Emergency Department immediately.

## 2022-08-02 NOTE — LETTER
Noni Perez was seen and treated in our emergency department on 8/2/2022. She may return to work on 8/4/2022. If you have any questions or concerns, please don't hesitate to call.       Dr. Edouard Montoya

## 2022-08-02 NOTE — ED PROVIDER NOTES
[id number]  EMERGENCY DEPARTMENT ENCOUNTER NOTE    Date: 2022  Patient Name: Dionicio Chavez    History of Presenting Illness     Chief Complaint   Patient presents with    Miscarriage     HPI: Dionicio Chavez, 28 y.o. female with a past medical history and outpatient medications as listed and reviewed below  presents for vaginal bleeding in pregnancy. Patient is , currently 4 weeks pregnant. She reports a 5 day history of vaginal bleeding that is described as contiuous perirod like bleeding with blood clot coin sized today. She also reports mild 1/10 abdominal pain currently not present.    - Lightheadedness, dizziness, or syncope are not present. - Dysuria & hematuria are not present. - Fever not present.  - Trauma not present.  - No other symptoms including any trauma, coagulopathy or blood thinner use. - Blood type: unknown     Patient was recently diagnosed with a UTI and pregnancy on Thursday prior to the bleeding starting. Ultrasound at that time did not reveal a gestational sac however her beta-hCG was less than the discriminatory zone. Medical History   I reviewed the medical, surgical, family, and social history, as well as allergies:    PCP: None    Past Medical History:  Past Medical History:   Diagnosis Date    Anxiety     Herpes simplex     Intramural leiomyoma of uterus 3/31/2022    1.2cm     Past Surgical History:  Past Surgical History:   Procedure Laterality Date    HX  SECTION      HX DILATION AND CURETTAGE      HX OTHER SURGICAL      Laparoscopy salpingostomy    HX WISDOM TEETH EXTRACTION       Current Outpatient Medications:  Current Outpatient Medications   Medication Instructions    acyclovir (ZOVIRAX) 5 % ointment Apply dime sized amount for every 4inch square surface area 6 times daily (every 3 hours) for 7 days.     cephALEXin (KEFLEX) 500 mg, Oral, 4 TIMES DAILY      Family History:  Family History   Adopted: Yes   Family history unknown: Yes     Social History:  Social History     Tobacco Use    Smoking status: Never    Smokeless tobacco: Never   Vaping Use    Vaping Use: Never used   Substance Use Topics    Alcohol use: Not Currently    Drug use: Not Currently     Allergies:  No Known Allergies    Review of Systems     Positives and pertinent negatives as per HPI. All other systems were reviewed and are negative. Physical Exam and Vital Signs   Vital Signs - Reviewed the patient's vital signs. Patient Vitals for the past 12 hrs:   Temp Pulse Resp BP SpO2   08/02/22 0914 98.5 °F (36.9 °C) 83 18 (!) 149/89 100 %     Physical Exam:    GENERAL: awake, alert, cooperative, not in distress  HEENT:  * Pupils equal, EOMI  * Head atraumatic  CV:  * audible heart sounds  * warm and perfused extremities bilaterally  PULMONARY: Good air movement, no wheezes, no crackles  ABDOMEN/: soft, no guarding, no suprapubic tenderness, nongravid, no abdominal tenderness  EXTREMITIES/BACK: warm and perfused, no tenderness, no edema  SKIN: no rashes or signs of trauma  NEURO:  * Speech clear  * Moves U&LE to command    Medical Decision Making   - I am the first and primary provider for this patient and am the primary provider of record. - I reviewed the vital signs, available nursing notes, past medical history, past surgical history, family history and social history. - Initial assessment performed. The patients presenting problems have been discussed, and the staff are in agreement with the care plan formulated and outlined with them. I have encouraged them to ask questions as they arise throughout their visit. - Available medical records, nursing notes, old EKGs, and EMS run sheets (if patient was EMS transported) were reviewed    MDM:   Patient is a 28 y.o. female presenting for vaginal bleeding in pregnancy. Vitals reveal no significant abnormalities and physical exam reveals no significant abnormalities.  Based on the history, physical exam, risk factors, and vital signs, differential diagnoses: threatened , active , missed , blood type incompatibility, ectopic pregnancy, subchorionic hemorrhage, anemia, hypovolemia, UTI. Will confirm blood type to ensure the patient does not qualify for Rhogam treatment. Will obtain basic labs to rule out anemia. Will initiate workup and treatment. See ED Course and Reassessment for evaluation and discussion. Results     Labs:  Recent Results (from the past 12 hour(s))   URINALYSIS W/ REFLEX CULTURE    Collection Time: 22  9:30 AM    Specimen: Urine   Result Value Ref Range    Color Red      Appearance Cloudy (A) Clear      Specific gravity 1.025 1.003 - 1.030      pH (UA) 5.0 5.0 - 8.0      Protein >300 (A) Negative mg/dL    Glucose Negative Negative mg/dL    Ketone 15 (A) Negative mg/dL    Bilirubin Negative Negative      Blood Large (A) Negative      Urobilinogen 4.0 (H) 0.2 - 1.0 EU/dL    Nitrites Positive (A) Negative      Leukocyte Esterase Small (A) Negative      WBC 20-50 0 - 4 /hpf    RBC >100 (H) 0 - 5 /hpf    Epithelial cells Moderate (A) Few /lpf    Bacteria 2+ (A) Negative /hpf    UA:UC IF INDICATED Urine Culture Ordered (A) Culture not indicated by UA result     HCG URINE, QL    Collection Time: 22  9:30 AM   Result Value Ref Range    HCG urine, QL Positive (A) Negative     CBC WITH AUTOMATED DIFF    Collection Time: 22  9:55 AM   Result Value Ref Range    WBC 3.9 3.6 - 11.0 K/uL    RBC 3.56 (L) 3.80 - 5.20 M/uL    HGB 11.0 (L) 11.5 - 16.0 g/dL    HCT 32.1 (L) 35.0 - 47.0 %    MCV 90.2 80.0 - 99.0 FL    MCH 30.9 26.0 - 34.0 PG    MCHC 34.3 30.0 - 36.5 g/dL    RDW 12.9 11.5 - 14.5 %    PLATELET 753 776 - 531 K/uL    MPV 9.7 8.9 - 12.9 FL    NEUTROPHILS 55 32 - 75 %    LYMPHOCYTES 27 12 - 49 %    MONOCYTES 17 (H) 5 - 13 %    EOSINOPHILS 0 0 - 7 %    BASOPHILS 1 0 - 1 %    IMMATURE GRANULOCYTES 0 0.0 - 0.5 %    ABS.  NEUTROPHILS 2.2 1.8 - 8.0 K/UL ABS. LYMPHOCYTES 1.1 0.8 - 3.5 K/UL    ABS. MONOCYTES 0.7 0.0 - 1.0 K/UL    ABS. EOSINOPHILS 0.0 0.0 - 0.4 K/UL    ABS. BASOPHILS 0.0 0.0 - 0.1 K/UL    ABS. IMM. GRANS. 0.0 0.00 - 0.04 K/UL    DF AUTOMATED     METABOLIC PANEL, COMPREHENSIVE    Collection Time: 08/02/22  9:55 AM   Result Value Ref Range    Sodium 138 136 - 145 mmol/L    Potassium 3.6 3.5 - 5.1 mmol/L    Chloride 101 97 - 108 mmol/L    CO2 26 21 - 32 mmol/L    Anion gap 11 5 - 15 mmol/L    Glucose 89 65 - 100 mg/dL    BUN 8 6 - 20 mg/dL    Creatinine 0.89 0.55 - 1.02 mg/dL    BUN/Creatinine ratio 9 (L) 12 - 20      GFR est AA >60 >60 ml/min/1.73m2    GFR est non-AA >60 >60 ml/min/1.73m2    Calcium 8.5 8.5 - 10.1 mg/dL    Bilirubin, total 0.7 0.2 - 1.0 mg/dL    Protein, total 7.6 6.4 - 8.2 g/dL    Albumin 3.9 3.5 - 5.0 g/dL    Globulin 3.7 2.0 - 4.0 g/dL    A-G Ratio 1.1 1.1 - 2.2     TYPE & SCREEN    Collection Time: 08/02/22  9:55 AM   Result Value Ref Range    Crossmatch Expiration 08/05/2022,2359     ABO/Rh(D) Mitchael Asa Positive     Antibody screen Negative    BETA HCG, QT    Collection Time: 08/02/22  9:55 AM   Result Value Ref Range    Beta HCG, .0 (H) 0 - 6 mIU/mL     Radiologic Studies:  CT Results  (Last 48 hours)      None          CXR Results  (Last 48 hours)      None          Medications ordered:  Medications   sodium chloride 0.9 % bolus infusion 1,000 mL (0 mL IntraVENous IV Completed 8/2/22 1120)     ED Course and Reassessment     ED Course:     ED Course as of 08/02/22 1215   Tue Aug 02, 2022   0954 UA shows UTI. Was given one dose of fosfomycin during ED last presentation. Will require course of ATBX. [SS]   1364 CBC does not show any evidence of acute process. Leukocytosis not present to suggest infection. Hemoglobin not suggestive of acute anemia. No significant electrolyte derangements. Creatinine is not elevated more than baseline range making CASSANDRA unlikely.      [SS]   1058 URJU 118, likely miscarriage given downtrend from 1000 5 days ago. Pending ABO. [SS]   6055 Blood type O+ve. No indication for rhogam. [SS]      ED Course User Index  [SS] Naty Mitchell MD       Reassessment:    The patient presents with vaginal bleeding and downtrending BHCG consistent with miscarriage. There is no evidence for malignant underlying process at this time. Imaging studies/ultrasound was reassuring at this time and did not reveal any acute process as discussed above. No significant acute anemia requiring transfusions or admission. No indication for Rhogam.    She will need prompt follow up with OB. At this juncture the patient is agreeable with prompt follow up with OBGYN and the patient understands the need to return immediately with any worsening symptoms or changes. After completion of workup and discussion of results and diagnoses with the patient, all the patient's questions were answered. The patient sounded understanding and agrees with the plan. The patient understands that at this time there is no evidence for a more malignant underlying process, but the patient also understands that early in the process of an illness, an emergency department workup can be falsely reassuring. Routine discharge counseling was given to the patient and the patient understands that worsening, changing or persistent symptoms should prompt an immediate call or follow up with their primary physician, obstetrician, or the emergency department. The importance of appropriate follow up was also discussed with the patient. More extensive discharge instructions were given in the patient's discharge paperwork. Final Disposition     Discharge: DISCHARGED FROM EMERGENCY DEPARTMENT    Patient will be discharged from the Emergency Department in stable condition. All of the diagnostic tests were reviewed and any questions were answered. Diagnosis, results, follow up if applicable, and return precautions were discussed.  I have also put together printed discharge instructions for them that include: 1) educational information regarding their diagnosis, 2) how to care for their diagnosis at home, as well a 3) list of reasons why they would want to return to the ED prior to their follow-up appointment, should their condition change. Any labs or imaging done in the ED will be either printed with the discharge paperwork or available through [address] DISCHARGE PLAN:  1. Current Discharge Medication List        START taking these medications    Details   cephALEXin (Keflex) 500 mg capsule Take 1 Capsule by mouth four (4) times daily for 7 days. Qty: 28 Capsule, Refills: 0           CONTINUE these medications which have NOT CHANGED    Details   acyclovir (ZOVIRAX) 5 % ointment Apply dime sized amount for every 4inch square surface area 6 times daily (every 3 hours) for 7 days. Qty: 15 g, Refills: 5    Associated Diagnoses: HSV (herpes simplex virus) anogenital infection            2.   Follow-up Information       Follow up With Specialties Details Why Contact Info    [address] [place] [address] Emergency Medicine Go to  If symptoms worsen [address]  [phone]    Your doctor  Schedule an appointment as soon as possible for a visit in 3 days            3. Return to ED if worse    4. Current Discharge Medication List        START taking these medications    Details   cephALEXin (Keflex) 500 mg capsule Take 1 Capsule by mouth four (4) times daily for 7 days. Qty: 28 Capsule, Refills: 0  Start date: 8/2/2022, End date: 8/9/2022             Diagnosis     Clinical Impression:   1. Miscarriage    2. Cystitis    3. Type O blood, Rh positive      Attestations:    Tru Cunha MD    Please note that this dictation was completed with [employer], the [employer] voice recognition software. Quite often unanticipated grammatical, syntax, homophones, and other interpretive errors are inadvertently transcribed by the computer software.   Please disregard these errors. Please excuse any errors that have escaped final proofreading. Thank you.

## 2022-08-03 ENCOUNTER — OFFICE VISIT (OUTPATIENT)
Dept: OBGYN CLINIC | Age: 32
End: 2022-08-03
Payer: MEDICAID

## 2022-08-03 VITALS
RESPIRATION RATE: 19 BRPM | HEART RATE: 69 BPM | BODY MASS INDEX: 34.21 KG/M2 | DIASTOLIC BLOOD PRESSURE: 93 MMHG | HEIGHT: 59 IN | SYSTOLIC BLOOD PRESSURE: 132 MMHG | WEIGHT: 169.7 LBS | TEMPERATURE: 97.8 F

## 2022-08-03 DIAGNOSIS — O03.9 MISCARRIAGE: Primary | ICD-10-CM

## 2022-08-03 DIAGNOSIS — A59.01 TRICHOMONAS VAGINALIS (TV) INFECTION: ICD-10-CM

## 2022-08-03 DIAGNOSIS — Z11.3 VENEREAL DISEASE SCREENING: ICD-10-CM

## 2022-08-03 LAB
BACTERIA SPEC CULT: NORMAL
SPECIAL REQUESTS,SREQ: NORMAL

## 2022-08-03 PROCEDURE — 99213 OFFICE O/P EST LOW 20 MIN: CPT | Performed by: OBSTETRICS & GYNECOLOGY

## 2022-08-03 PROCEDURE — 76817 TRANSVAGINAL US OBSTETRIC: CPT | Performed by: OBSTETRICS & GYNECOLOGY

## 2022-08-03 RX ORDER — MISOPROSTOL 200 UG/1
200 TABLET ORAL ONCE
Qty: 1 TABLET | Refills: 0 | Status: SHIPPED | OUTPATIENT
Start: 2022-08-03 | End: 2022-08-03

## 2022-08-03 RX ORDER — IBUPROFEN 800 MG/1
800 TABLET ORAL
Qty: 60 TABLET | Refills: 0 | Status: SHIPPED | OUTPATIENT
Start: 2022-08-03

## 2022-08-03 RX ORDER — ONDANSETRON 4 MG/1
4 TABLET, ORALLY DISINTEGRATING ORAL
Qty: 20 TABLET | Refills: 0 | Status: SHIPPED | OUTPATIENT
Start: 2022-08-03

## 2022-08-03 RX ORDER — OXYCODONE AND ACETAMINOPHEN 5; 325 MG/1; MG/1
1 TABLET ORAL
Qty: 4 TABLET | Refills: 0 | Status: SHIPPED | OUTPATIENT
Start: 2022-08-03 | End: 2022-08-04

## 2022-08-03 RX ORDER — VALACYCLOVIR HYDROCHLORIDE 1 G/1
TABLET, FILM COATED ORAL
COMMUNITY

## 2022-08-03 NOTE — PROGRESS NOTES
Kirk Cortney is a 28 y.o. female [id number], lmp 06/21/22, who presents today for the following:  Chief Complaint   Patient presents with    ED Follow-up     States had a miscarriage      Patient states she was seen in the ER on 7/28/22 due to severe abdominal cramping. She discovered she was pregnant then. Unplanned. She returned to the ER on 8/2/22 due to heavy vaginal bleeding with passage of clots, quarter sized. She was diagnosed with msicarriage. States she has been bleeding since 07/21/22. +Cramping today. Denies fevers or chills. B-Hcg on 7/20/2022 1014.1. Beta-Hcg on 8/2/2022 201.0.    7/28/22:  Narrative & Impression   INDICATION:  Positive pregnancy test, pelvic pain and vaginal bleeding. EXAM: TRANSVAGINAL ULTRASONOGRAPHY. COMPARISON: None . PROCEDURE: The pelvis was scanned via high resolution real-time linear array  sonography, using the transvaginal approach only. [employer] FINDINGS transvaginal[de-identified]  The UTERUS MEASURES 8.6 x 5.1 by 5.8 cm. The central  endometrium measures 8mm in thickness. There is no focal endometrial mass or  fluid collection. No intrauterine gestation is documented. There is a small amount of free fluid in the cul-de-sac. The RIGHT OVARY measures 3.7 x 1.9 x 1 2.0cm . The right ovary contains a  hypoechoic mass with a cystic center, 2.2 x 1.8 x 1.6 cm, with the central cyst  approximately 9 mm in diameter. The LEFT OVARY cannot be seen because of  overlying bowel gas obscuring the adnexal regions. IMPRESSION  1. . Transvaginal pelvic ultrasound revealing unremarkable uterus with no  intrauterine gestation. Right ovarian small cystic mass. Left ovary not seen. Small free fluid. Review of Systems   Constitutional:  Negative for chills and fever. Respiratory:  Negative for shortness of breath. Cardiovascular:  Negative for chest pain. Gastrointestinal:  Positive for abdominal pain.  Negative for constipation, diarrhea, nausea and vomiting. Genitourinary:  Negative for dysuria. Past Medical History:   Diagnosis Date    Anxiety     Herpes simplex     Intramural leiomyoma of uterus 3/31/2022    1.2cm       No Known Allergies     Current Outpatient Medications   Medication Sig    valACYclovir (Valtrex) 1 gram tablet Take  by mouth. PRN    oxyCODONE-acetaminophen (Percocet) 5-325 mg per tablet Take 1 Tablet by mouth every six (6) hours as needed for Pain for up to 1 day. Max Daily Amount: 4 Tablets. ondansetron (ZOFRAN ODT) 4 mg disintegrating tablet Take 1 Tablet by mouth every eight (8) hours as needed for Nausea or Vomiting. ibuprofen (MOTRIN) 800 mg tablet Take 1 Tablet by mouth every eight (8) hours as needed for Pain.    miSOPROStoL (CYTOTEC) 200 mcg tablet Take 1 Tablet by mouth once for 1 dose. cephALEXin (Keflex) 500 mg capsule Take 1 Capsule by mouth four (4) times daily for 7 days. acyclovir (ZOVIRAX) 5 % ointment Apply dime sized amount for every 4inch square surface area 6 times daily (every 3 hours) for 7 days. No current facility-administered medications for this visit. BP (!) 132/93 (BP 1 Location: Right arm, BP Patient Position: Sitting, BP Cuff Size: Adult)   Pulse 69   Temp 97.8 °F (36.6 °C) (Temporal)   Resp 19   Ht 4' 11\" (1.499 m)   Wt 169 lb 11.2 oz (77 kg)   LMP 07/26/2022   BMI 34.28 kg/m²    Physical Exam  Constitutional:       Appearance: Normal appearance. Genitourinary:      Genitourinary Comments: Vulva: no masses, atrophy, or lesions. Vagina: no tenderness, erythema, cystocele, rectocele, abnormal vaginal discharge, or vesicle(s), lesions, or ulcers. Cervix: no cervical motion tenderness, small amt of products of conception noted at os, no hemorrhage, less than 5 ccs of blood in vault, swab obtained Uterus: normal size and shape and midline, mobile, non-tender, and no uterine prolapse.  Bladder/Urethra: no urethral discharge or mass and normal meatus and bladder non distended. Adnexa/Parametria: no parametrial tenderness or mass and no adnexal tenderness or ovarian mass. HENT:      Head: Normocephalic and atraumatic. Eyes:      Extraocular Movements: Extraocular movements intact. Pulmonary:      Effort: Pulmonary effort is normal.   Abdominal:      General: Abdomen is flat. Palpations: Abdomen is soft. Tenderness: abdominal tenderness There is no guarding or rebound. Musculoskeletal:      Cervical back: Normal range of motion. Neurological:      Mental Status: She is alert and oriented to person, place, and time. Skin:     General: Skin is warm and dry. Psychiatric:         Mood and Affect: Mood normal.         Behavior: Behavior normal.   Vitals reviewed. Results for orders placed or performed in visit on 08/03/22   AMB POC US OB TRANSVAGINAL    Narrative    Ruben Luong. Transvaginal ultrasound performed. Findings:   No intrauterine gestation seen. Endometrial stripe 0.790cm. Impression    Miscarriage. Assessment/plan:     ICD-10-CM ICD-9-CM    1. Miscarriage  O03.9 634.90 oxyCODONE-acetaminophen (Percocet) 5-325 mg per tablet      ondansetron (ZOFRAN ODT) 4 mg disintegrating tablet      ibuprofen (MOTRIN) 800 mg tablet      miSOPROStoL (CYTOTEC) 200 mcg tablet      AMB POC US OB TRANSVAGINAL      2. Venereal disease screening  Z11.3 V74.5 CT/NG/T.VAGINALIS AMPLIFICATION         Misoprostol rx sent to help complete the miscarriage due to small amt of products seen at os. Reviewed expectations. F/up in 2 weeks. Reviewed warning signs for which to return to ER or call the office to include but not be limited to fevers, chills, heavy vaginal bleeding, foul smelling discharge. Accepts GCT testing.

## 2022-08-03 NOTE — PROGRESS NOTES
Chief Complaint   Patient presents with    ED Follow-up     States had a miscarriage     1. \"Have you been to the ER, urgent care clinic since your last visit? Hospitalized since your last visit? \" Yes Where: [facility]    2. \"Have you seen or consulted any other health care providers outside of the [address] [place] [address] since your last visit? \" No     3. For patients aged 39-70: Has the patient had a colonoscopy? NA - based on age     If the patient is female:    4. For patients aged 41-77: Has the patient had a mammogram within the past 2 years? NA - based on age    11. For patients aged 21-65: Has the patient had a pap smear?  Yes - no Care Gap present    Visit Vitals  BP (!) 132/93 (BP 1 Location: Right arm, BP Patient Position: Sitting, BP Cuff Size: Adult)   Pulse 69   Temp 97.8 °F (36.6 °C) (Temporal)   Resp 19   Ht 4' 11\" (1.499 m)   Wt 169 lb 11.2 oz (77 kg)   LMP 07/26/2022   BMI 34.28 kg/m²

## 2022-08-03 NOTE — LETTER
[facility] [employer]    8/3/2022    Ms. Rachana Gilmore  [address] [facility] [place] [address]      To Whom It May Concern:    Rachana Gilmore was under the care of Dr. Aliyah Mckee. She will return to work on 8/08/2022 without restrictions. If there are questions or concerns please have the patient contact our office.         Sincerely,      Flakita Holcomb MD

## 2022-08-08 LAB
C TRACH RRNA SPEC QL NAA+PROBE: NEGATIVE
N GONORRHOEA RRNA SPEC QL NAA+PROBE: NEGATIVE
T VAGINALIS RRNA SPEC QL NAA+PROBE: POSITIVE

## 2022-08-09 PROBLEM — A59.01 TRICHOMONAS VAGINALIS (TV) INFECTION: Status: ACTIVE | Noted: 2021-02-10

## 2022-08-09 RX ORDER — METRONIDAZOLE 500 MG/1
500 TABLET ORAL EVERY 12 HOURS
Qty: 14 TABLET | Refills: 0 | Status: SHIPPED | OUTPATIENT
Start: 2022-08-09 | End: 2022-08-16

## 2022-08-09 RX ORDER — FLUCONAZOLE 150 MG/1
TABLET ORAL
Qty: 1 TABLET | Refills: 1 | Status: SHIPPED | OUTPATIENT
Start: 2022-08-09 | End: 2022-08-20

## 2022-08-09 NOTE — PROGRESS NOTES
PORTAL MESSAGE  Peter Manley,     Your vaginal swab shows a sexually transmitted infection called trichomonas. I will send in metronidazole to the pharmacy for you. Please discuss this with your sexual partner. That person needs to be tested and treated. Avoid having sex until you are both treated or use condoms. Please call the office to schedule for test of cure appointment for 3 months time.     Thank you,  Dr. Andres Valero.

## 2022-08-19 ENCOUNTER — OFFICE VISIT (OUTPATIENT)
Dept: OBGYN CLINIC | Age: 32
End: 2022-08-19
Payer: MEDICAID

## 2022-08-19 VITALS
WEIGHT: 167.2 LBS | OXYGEN SATURATION: 100 % | HEIGHT: 59 IN | RESPIRATION RATE: 20 BRPM | DIASTOLIC BLOOD PRESSURE: 79 MMHG | SYSTOLIC BLOOD PRESSURE: 130 MMHG | HEART RATE: 74 BPM | BODY MASS INDEX: 33.71 KG/M2 | TEMPERATURE: 97.8 F

## 2022-08-19 DIAGNOSIS — A59.01 TRICHOMONAS VAGINALIS (TV) INFECTION: ICD-10-CM

## 2022-08-19 DIAGNOSIS — N83.201 RIGHT OVARIAN CYST: ICD-10-CM

## 2022-08-19 DIAGNOSIS — O03.9 MISCARRIAGE: Primary | ICD-10-CM

## 2022-08-19 PROCEDURE — 76817 TRANSVAGINAL US OBSTETRIC: CPT | Performed by: OBSTETRICS & GYNECOLOGY

## 2022-08-19 PROCEDURE — 99213 OFFICE O/P EST LOW 20 MIN: CPT | Performed by: OBSTETRICS & GYNECOLOGY

## 2022-08-19 NOTE — PROGRESS NOTES
Manasa Dallas is a 28 y.o. female [id number] who presents today for the following:  Chief Complaint   Patient presents with    Follow-up     miscarriage      Patient initially seen on 8/3/22 for f/up miscarriage diagnosed in ER due to vaginal bleeding and decreasing beta-hcg. She took Cytotec that was prescribed due to products of conception seen at cervical os on exam. She bleed for an additional week and it resolved. She denies abnormal vaginal discharge, pruritus, or odors. She denies f/c. She completed the Metronidazole for the trichomonas. Her partner has been treated. She has not resumed intercourse. Review of Systems   Constitutional:  Negative for chills and fever. Respiratory:  Negative for shortness of breath. Cardiovascular:  Negative for chest pain. Gastrointestinal:  Negative for abdominal pain, constipation, diarrhea, nausea and vomiting. Genitourinary:  Negative for dysuria. Past Medical History:   Diagnosis Date    Anxiety     Herpes simplex     Intramural leiomyoma of uterus 3/31/2022    1.2cm       No Known Allergies     Current Outpatient Medications   Medication Sig    DICLOFENAC SODIUM PO Take  by mouth. Indications: acute postoperative pain following total knee arthroplasty    valACYclovir (VALTREX) 1 gram tablet Take  by mouth. PRN    acyclovir (ZOVIRAX) 5 % ointment Apply dime sized amount for every 4inch square surface area 6 times daily (every 3 hours) for 7 days. fluconazole (DIFLUCAN) 150 mg tablet Take 1 tablet by mouth x1 dose to prevent a yeast infection after completing the antibiotics. Repeat dose in 72 hours if symptoms continue. Indications: treatment to prevent vulvovaginal yeast infection (Patient not taking: Reported on 8/19/2022)    ondansetron (ZOFRAN ODT) 4 mg disintegrating tablet Take 1 Tablet by mouth every eight (8) hours as needed for Nausea or Vomiting.  (Patient not taking: Reported on 8/19/2022)    ibuprofen (MOTRIN) 800 mg tablet Take 1 Tablet by mouth every eight (8) hours as needed for Pain. (Patient not taking: Reported on 8/19/2022)     No current facility-administered medications for this visit. /79 (BP 1 Location: Right arm, BP Patient Position: Sitting, BP Cuff Size: Adult)   Pulse 74   Temp 97.8 °F (36.6 °C) (Temporal)   Resp 20   Ht 4' 11\" (1.499 m)   Wt 167 lb 3.2 oz (75.8 kg)   LMP 07/26/2022   SpO2 100%   BMI 33.77 kg/m²    Physical Exam  Constitutional:       Appearance: Normal appearance. Genitourinary:      Genitourinary Comments: Vulva: no masses, atrophy, or lesions. Vagina: no tenderness, erythema, cystocele, rectocele, abnormal vaginal discharge, or vesicle(s), lesions, or ulcers. Cervix: no cervical motion tenderness or abnormal discharge; Uterus: normal size and shape and midline, mobile, non-tender, and no uterine prolapse. Bladder/Urethra: no urethral discharge or mass and normal meatus and bladder non distended. Adnexa/Parametria: no parametrial tenderness or mass and no adnexal tenderness or ovarian mass. HENT:      Head: Normocephalic and atraumatic. Eyes:      Extraocular Movements: Extraocular movements intact. Pulmonary:      Effort: Pulmonary effort is normal.   Abdominal:      General: Abdomen is flat. Palpations: Abdomen is soft. Musculoskeletal:      Cervical back: Normal range of motion. Neurological:      Mental Status: She is alert and oriented to person, place, and time. Skin:     General: Skin is warm and dry. Psychiatric:         Mood and Affect: Mood normal.         Behavior: Behavior normal.   Vitals reviewed. Results for orders placed or performed in visit on 08/19/22   AMB POC US OB TRANSVAGINAL    Narrative    Transvaginal ultrasound performed to check endometrial stripe for patient with recent miscarriage. Endometrial stripe average is 0.478cm. Impression    Status post miscarriage with normal endometrial stripe measurement. Assessment/plan:     ICD-10-CM ICD-9-CM    1. Miscarriage  O03.9 634.90 AMB POC US OB TRANSVAGINAL      2. Trichomonas vaginalis (TV) infection  A59.01 131.01       3. Right ovarian cyst  N83.201 620.2 US PELV NON OB W TV         Complete dmiscarriage. RTC in 3 months to check for trichomonas reinfection. F/u right ovarian cyst in 6 months via ultrasound.

## 2022-08-19 NOTE — PROGRESS NOTES
1. Have you been to the ER, urgent care clinic since your last visit? Hospitalized since your last visit? Yes When: August 2022 Where: [facility] Reason for visit: miscarriage    2. Have you seen or consulted any other health care providers outside of the [address] [place] [address] since your last visit? Include any pap smears or colon screening. No    Blood pressure 130/79, pulse 74, temperature 97.8 °F (36.6 °C), temperature source Temporal, resp. rate 20, height 4' 11\" (1.499 m), weight 167 lb 3.2 oz (75.8 kg), last menstrual period 07/26/2022, SpO2 100 %.       Chief Complaint   Patient presents with    Follow-up     miscarriage

## 2022-08-20 PROBLEM — N83.201 RIGHT OVARIAN CYST: Status: ACTIVE | Noted: 2022-08-20

## 2022-08-20 RX ORDER — ERGOCALCIFEROL 1.25 MG/1
CAPSULE ORAL
COMMUNITY
Start: 2022-05-18

## 2022-12-15 ENCOUNTER — OFFICE VISIT (OUTPATIENT)
Dept: OBGYN CLINIC | Age: 32
End: 2022-12-15
Payer: MEDICAID

## 2022-12-15 VITALS
BODY MASS INDEX: 32.42 KG/M2 | HEART RATE: 59 BPM | WEIGHT: 160.5 LBS | SYSTOLIC BLOOD PRESSURE: 128 MMHG | DIASTOLIC BLOOD PRESSURE: 91 MMHG | OXYGEN SATURATION: 99 % | TEMPERATURE: 97.5 F | RESPIRATION RATE: 18 BRPM

## 2022-12-15 DIAGNOSIS — A59.01 TRICHOMONAS VAGINALIS (TV) INFECTION: Primary | ICD-10-CM

## 2022-12-15 DIAGNOSIS — Z11.3 VENEREAL DISEASE SCREENING: ICD-10-CM

## 2022-12-15 PROCEDURE — 99213 OFFICE O/P EST LOW 20 MIN: CPT | Performed by: OBSTETRICS & GYNECOLOGY

## 2022-12-15 RX ORDER — DICLOFENAC SODIUM 75 MG/1
TABLET, DELAYED RELEASE ORAL
COMMUNITY
Start: 2022-12-14

## 2022-12-15 NOTE — PROGRESS NOTES
Gui Rios is a 33 y.o. female [id number], lmp 11/18/22, who presents today for the following:  Chief Complaint   Patient presents with    Follow-up     LEANNE  3 month follow-up. spm        She completed the medication for trichomonas. She states partner was also treated. She had protected intercourse with him once since then. She does have increased white vaginal discharge. +Vaginal pruritus. Denies vaginal odor. H/o HSV. Review of Systems   Constitutional:  Negative for chills and fever. Respiratory:  Negative for shortness of breath. Cardiovascular:  Negative for chest pain. Gastrointestinal:  Negative for abdominal pain, constipation, diarrhea, nausea and vomiting. Genitourinary:  Negative for dysuria. Past Medical History:   Diagnosis Date    Anxiety     Herpes simplex     Intramural leiomyoma of uterus 3/31/2022    1.2cm       No Known Allergies     Current Outpatient Medications   Medication Sig    valACYclovir (VALTREX) 1 gram tablet Take  by mouth. PRN    acyclovir (ZOVIRAX) 5 % ointment Apply dime sized amount for every 4inch square surface area 6 times daily (every 3 hours) for 7 days. diclofenac EC (VOLTAREN) 75 mg EC tablet      No current facility-administered medications for this visit. BP (!) 128/91 (BP 1 Location: Left upper arm, BP Patient Position: Sitting, BP Cuff Size: Adult)   Pulse (!) 59   Temp 97.5 °F (36.4 °C) (Temporal)   Resp 18   Wt 160 lb 8 oz (72.8 kg)   LMP 11/18/2022 (Approximate)   SpO2 99%   BMI 32.42 kg/m²    Physical Exam  Constitutional:       Appearance: Normal appearance. Genitourinary:      Genitourinary Comments: Vulva: no masses, atrophy, or lesions. Vagina: no tenderness, erythema, cystocele, rectocele, abnormal vaginal discharge, or vesicle(s), lesions, or ulcers.  Cervix: no cervical motion tenderness, small amt of thick white homogenous discharge seen , swab obtained; Uterus: normal size and shape and midline, mobile, non-tender, and no uterine prolapse. Bladder/Urethra: no urethral discharge or mass and normal meatus and bladder non distended. Adnexa/Parametria: no parametrial tenderness or mass and no adnexal tenderness or ovarian mass. HENT:      Head: Normocephalic and atraumatic. Eyes:      Extraocular Movements: Extraocular movements intact. Pulmonary:      Effort: Pulmonary effort is normal.   Abdominal:      General: Abdomen is flat. Palpations: Abdomen is soft. Musculoskeletal:      Cervical back: Normal range of motion. Neurological:      Mental Status: She is alert and oriented to person, place, and time. Skin:     General: Skin is warm and dry. Psychiatric:         Mood and Affect: Mood normal.         Behavior: Behavior normal.   Vitals reviewed. Assessment/plan:     ICD-10-CM ICD-9-CM    1. Trichomonas vaginalis (TV) infection  A59.01 131.01 NUSWAB VAGINITIS PLUS (VG+) WITH CANDIDA (SIX SPECIES)      2. Venereal disease screening  Z11.3 V74.5 NUAB VAGINITIS PLUS (VG+) WITH CANDIDA (SIX SPECIES)      HIV 1/2 AG/AB, 4TH GENERATION,W RFLX CONFIRM      HEPATITIS C AB, RFLX TO QT BY PCR      RPR         Desires STI testing.

## 2022-12-15 NOTE — PROGRESS NOTES
Chief Complaint   Patient presents with    Follow-up     LEANNE  3 week follow-up.spm     1. \"Have you been to the ER, urgent care clinic since your last visit? Hospitalized since your last visit? \" No    2. \"Have you seen or consulted any other health care providers outside of the [address] [place] [address] since your last visit? \" No     3. For patients aged 39-70: Has the patient had a colonoscopy? NA - based on age     If the patient is female:    4. For patients aged 41-77: Has the patient had a mammogram within the past 2 years? No based on age    11. For patients aged 21-65: Has the patient had a pap smear?  Yes - no Care Gap present  Visit Vitals  BP (!) 128/91 (BP 1 Location: Left upper arm, BP Patient Position: Sitting, BP Cuff Size: Adult)   Pulse (!) 59   Temp 97.5 °F (36.4 °C) (Temporal)   Resp 18   Wt 160 lb 8 oz (72.8 kg)   LMP 11/18/2022 (Approximate)   SpO2 99%   BMI 32.42 kg/m²

## 2022-12-18 LAB
A VAGINAE DNA VAG QL NAA+PROBE: ABNORMAL SCORE
BVAB2 DNA VAG QL NAA+PROBE: ABNORMAL SCORE
C ALBICANS DNA VAG QL NAA+PROBE: NEGATIVE
C GLABRATA DNA VAG QL NAA+PROBE: NEGATIVE
C KRUSEI DNA VAG QL NAA+PROBE: NEGATIVE
C LUSITANIAE DNA VAG QL NAA+PROBE: NEGATIVE
C TRACH DNA VAG QL NAA+PROBE: NEGATIVE
CANDIDA DNA VAG QL NAA+PROBE: NEGATIVE
MEGA1 DNA VAG QL NAA+PROBE: ABNORMAL SCORE
N GONORRHOEA DNA VAG QL NAA+PROBE: NEGATIVE
T VAGINALIS DNA VAG QL NAA+PROBE: NEGATIVE

## 2022-12-26 PROBLEM — A59.01 TRICHOMONAS VAGINALIS (TV) INFECTION: Status: RESOLVED | Noted: 2021-02-10 | Resolved: 2022-12-26

## 2022-12-27 NOTE — ADDENDUM NOTE
Addended by: Geovany Diaz on: 12/26/2022 09:28 PM     Modules accepted: Orders Partial Purse String (Intermediate) Text: Given the location of the defect and the characteristics of the surrounding skin an intermediate purse string closure was deemed most appropriate.  Undermining was performed circumfirentially around the surgical defect.  A purse string suture was then placed and tightened. Wound tension only allowed a partial closure of the circular defect.

## 2022-12-27 NOTE — PROGRESS NOTES
PORTAL MESSAGE  Peter Manley,     The vaginal swab shows you have bacterial vaginosis which is when the bad bacteria grows more than the good bacteria in the vagina. I will send Metronidazole to the pharmacy for you to treat it. Trichomonas is gone.      Thank you,  Dr. Severo Bills.

## 2023-02-10 DIAGNOSIS — A60.9 HSV (HERPES SIMPLEX VIRUS) ANOGENITAL INFECTION: Primary | ICD-10-CM

## 2023-02-10 RX ORDER — ACYCLOVIR 50 MG/G
OINTMENT TOPICAL
Qty: 15 G | Refills: 5 | Status: SHIPPED | OUTPATIENT
Start: 2023-02-10

## 2023-02-10 RX ORDER — VALACYCLOVIR HYDROCHLORIDE 500 MG/1
TABLET, FILM COATED ORAL
Qty: 60 TABLET | Refills: 5 | Status: SHIPPED | OUTPATIENT
Start: 2023-02-10

## 2023-03-09 ENCOUNTER — HOSPITAL ENCOUNTER (OUTPATIENT)
Dept: ULTRASOUND IMAGING | Age: 33
Discharge: HOME | End: 2023-03-09
Attending: OBSTETRICS & GYNECOLOGY
Payer: MEDICAID

## 2023-03-09 DIAGNOSIS — N83.201 RIGHT OVARIAN CYST: ICD-10-CM

## 2023-03-09 PROCEDURE — 76830 TRANSVAGINAL US NON-OB: CPT

## 2023-03-16 PROBLEM — N83.201 RIGHT OVARIAN CYST: Status: RESOLVED | Noted: 2022-08-20 | Resolved: 2023-03-16

## 2023-04-24 ENCOUNTER — OFFICE VISIT (OUTPATIENT)
Dept: OBGYN CLINIC | Age: 33
End: 2023-04-24
Payer: MEDICAID

## 2023-04-24 VITALS
RESPIRATION RATE: 16 BRPM | TEMPERATURE: 96.9 F | BODY MASS INDEX: 31.13 KG/M2 | HEART RATE: 60 BPM | OXYGEN SATURATION: 99 % | SYSTOLIC BLOOD PRESSURE: 115 MMHG | HEIGHT: 59 IN | DIASTOLIC BLOOD PRESSURE: 76 MMHG | WEIGHT: 154.44 LBS

## 2023-04-24 DIAGNOSIS — A60.9 HSV (HERPES SIMPLEX VIRUS) ANOGENITAL INFECTION: ICD-10-CM

## 2023-04-24 DIAGNOSIS — N63.0 BREAST NODULE: ICD-10-CM

## 2023-04-24 DIAGNOSIS — Z12.39 ENCOUNTER FOR BREAST CANCER SCREENING USING NON-MAMMOGRAM MODALITY: ICD-10-CM

## 2023-04-24 DIAGNOSIS — Z01.419 ENCOUNTER FOR GYNECOLOGICAL EXAMINATION WITHOUT ABNORMAL FINDING: Primary | ICD-10-CM

## 2023-04-24 DIAGNOSIS — Z11.3 VENEREAL DISEASE SCREENING: ICD-10-CM

## 2023-04-24 DIAGNOSIS — Z12.4 SCREENING FOR CERVICAL CANCER: ICD-10-CM

## 2023-04-24 PROCEDURE — 99395 PREV VISIT EST AGE 18-39: CPT | Performed by: OBSTETRICS & GYNECOLOGY

## 2023-04-24 RX ORDER — VALACYCLOVIR HYDROCHLORIDE 500 MG/1
TABLET, FILM COATED ORAL
Qty: 60 TABLET | Refills: 5 | Status: SHIPPED | OUTPATIENT
Start: 2023-04-24

## 2023-04-24 RX ORDER — ACYCLOVIR 50 MG/G
OINTMENT TOPICAL
Qty: 15 G | Refills: 5 | Status: SHIPPED | OUTPATIENT
Start: 2023-04-24

## 2023-04-24 NOTE — PROGRESS NOTES
Chief Complaint   Patient presents with    Annual Exam     Visit Vitals  /76 (BP 1 Location: Left upper arm, BP Patient Position: Sitting, BP Cuff Size: Adult)   Pulse 60   Temp 96.9 °F (36.1 °C)   Resp 16   Ht 4' 11\" (1.499 m)   Wt 154 lb 7 oz (70.1 kg)   LMP 04/03/2023 (Exact Date)   SpO2 99%   BMI 31.19 kg/m²

## 2023-04-24 NOTE — PROGRESS NOTES
HPI: Adonis Daniels is a 33 y.o. female [id number], LMP 4/3/23, who presents today for the following:  Chief Complaint   Patient presents with    Annual Exam      She denies abnormal vaginal bleeding, vaginal/vulvar pruritus; abnormal vaginal discharge. Mild vaginal odor. H/o trichomonas. H/o HSV. Last mammogram: never. Last colonoscopy: never. Past Medical History:   Diagnosis Date    Anxiety     Herpes simplex     Intramural leiomyoma of uterus 3/31/2022    1.2cm    Trichomonas vaginalis (TV) infection 2/10/2021       Past Surgical History:   Procedure Laterality Date    HX  SECTION      HX DILATION AND CURETTAGE      HX OTHER SURGICAL      Laparoscopy salpingostomy for ectopic (Dr Susan Ferreira)    Johnny Applegate WISDOM TEETH EXTRACTION         Family History   Adopted: Yes   Family history unknown: Yes       Social History     Socioeconomic History    Marital status: SINGLE     Spouse name: Not on file    Number of children: Not on file    Years of education: Not on file    Highest education level: Associate degree: academic program   Occupational History    Occupation: medical assistant   Tobacco Use    Smoking status: Never    Smokeless tobacco: Never   Vaping Use    Vaping Use: Never used   Substance and Sexual Activity    Alcohol use: Yes     Comment: SOCIAL- rare    Drug use: Not Currently    Sexual activity: Yes     Partners: Male     Comment: denies h/o abnml pap smears; h/o trichomonas and HSV   Other Topics Concern    Not on file   Social History Narrative    Not on file     Social Determinants of Health     Financial Resource Strain: Not on file   Food Insecurity: Not on file   Transportation Needs: Not on file   Physical Activity: Inactive    Days of Exercise per Week: 0 days    Minutes of Exercise per Session: 0 min   Stress: Not on file   Social Connections: Not on file   Intimate Partner Violence: Not At Risk    Fear of Current or Ex-Partner: No    Emotionally Abused: No    Physically Abused:  No Sexually Abused: No   Housing Stability: Not on file       No Known Allergies       Current Outpatient Medications:     valACYclovir (VALTREX) 500 mg tablet, Take one tablet by mouth twice a day for 3 days for an outbreak. Otherwise take 1 tablet by mouth daily for suppression. , Disp: 60 Tablet, Rfl: 5    acyclovir (ZOVIRAX) 5 % ointment, Apply dime sized amount for every 4inch square surface area 6 times daily (every 3 hours) for 7 days. , Disp: 15 g, Rfl: 5       Review of Systems: Denies issues with eyes, ears, mouth, nose. Denies fevers/chills. 20 lb weight loss, not intentional, getting worked up with PCP. Denies chest pain, shortness of breath, nausea, vomiting, constipation, diarrhea or abdominal pain. Denies dysuria. Denies muscle aches, weakness, numbness or tingling. Denies issues with breasts. Denies bleeding/clotting d/o's. +Anxiety. Denies depression, S/HI. OBJECTIVE:  /76 (BP 1 Location: Left upper arm, BP Patient Position: Sitting, BP Cuff Size: Adult)   Pulse 60   Temp 96.9 °F (36.1 °C)   Resp 16   Ht 4' 11\" (1.499 m)   Wt 154 lb 7 oz (70.1 kg)   LMP 04/03/2023 (Exact Date)   SpO2 99%   BMI 31.19 kg/m²      Constitutional  Appearance: well-nourished, well developed, alert, in no acute distress    HENT  Head and Face: appears normal    Neck  Inspection/Palpation: normal appearance      Breasts  Symmetric,  right breast pea sized nodule, mobile, 9 o'clock outer mid breast ,no tenderness, no skin changes, no nipple abnormality, no nipple discharge, no axillary or supraclavicular lymphadenopathy.     Chest  Respiratory Effort: normal      Gastrointestinal  Abdominal Examination: abdomen non-tender to palpation, no masses present  Liver and spleen: no hepatomegaly present, spleen not palpable      Genitourinary  External Genitalia: normal appearance for age, no discharge present, no tenderness present, no inflammatory lesions present, no masses present, no atrophy present  Vagina: normal vaginal vault without central or paravaginal defects, no discharge present, no inflammatory lesions present, no masses present  Bladder: non-tender to palpation  Urethra: appears normal  Cervix: normal, no cervical motion tenderness or abnormal discharge, swab obtained  Uterus: normal size, shape and consistency  Adnexa: no adnexal tenderness present, no adnexal masses present  Perineum: perineum within normal limits, no evidence of trauma, no rashes or skin lesions present  Anus: anus within normal limits, no hemorrhoids present    Skin  General Inspection: no rash, no lesions identified    Neurologic/Psychiatric  Mental Status:  Orientation: grossly oriented to person, place and time  Mood and Affect: mood normal, affect appropriate          Assessment/plan:    ICD-10-CM ICD-9-CM    1. Encounter for gynecological examination without abnormal finding  Z01.419 V72.31       2. Screening for cervical cancer  Z12.4 V76.2       3. Venereal disease screening  Z11.3 V74.5 NUSWAB VAGINITIS PLUS (VG+) WITH CANDIDA (SIX SPECIES)      4. Encounter for breast cancer screening using non-mammogram modality  Z12.39 V76.10       5. HSV (herpes simplex virus) anogenital infection  A60.9 054.9 valACYclovir (VALTREX) 500 mg tablet      acyclovir (ZOVIRAX) 5 % ointment      6. Breast nodule  N63.0 793.89 US BREAST RT COMPLETE 4 QUAD           -Annual gynecologic exam.    -Cervical cancer screening- pap smear and HPV co testing neg March 2022. Due in 2027. -Breast cancer screening- breast awareness discussed; mammograms beginning at age 36. US right breast ordered for pea sized nodule- could be fatty tissue. -STI screening-accepts testing: G/C/T, HIV, RPR, HCV (bloodwork in system from Dec 2022) ordered. -HPV vaccination- counseled. Believes she has been vaccinated.      -Colon cancer screening- colonoscopy starting at age 39.     -Bone health-discussed weightbearing exercises, vitamin D and calcium supplementation.

## 2023-04-25 LAB
HCV AB SERPL QL IA: NORMAL
HCV IGG SERPL QL IA: NON REACTIVE
HIV 1+2 AB+HIV1 P24 AG SERPL QL IA: NON REACTIVE
RPR SER QL: NON REACTIVE

## 2023-04-27 ENCOUNTER — TELEPHONE (OUTPATIENT)
Dept: OBGYN CLINIC | Age: 33
End: 2023-04-27

## 2023-04-27 ENCOUNTER — HOSPITAL ENCOUNTER (OUTPATIENT)
Dept: MAMMOGRAPHY | Age: 33
End: 2023-04-27
Attending: OBSTETRICS & GYNECOLOGY
Payer: MEDICAID

## 2023-04-27 ENCOUNTER — HOSPITAL ENCOUNTER (OUTPATIENT)
Dept: MAMMOGRAPHY | Age: 33
Discharge: HOME | End: 2023-04-27
Attending: OBSTETRICS & GYNECOLOGY
Payer: MEDICAID

## 2023-04-27 DIAGNOSIS — N63.0 BREAST NODULE: ICD-10-CM

## 2023-04-27 DIAGNOSIS — N63.0 BREAST NODULE: Primary | ICD-10-CM

## 2023-04-27 PROCEDURE — 77062 BREAST TOMOSYNTHESIS BI: CPT

## 2023-04-27 PROCEDURE — 76642 ULTRASOUND BREAST LIMITED: CPT

## 2023-04-27 NOTE — TELEPHONE ENCOUNTER
Received a message from [place] with [facility] requesting an order for a diagnostic bilateral mammogram for the patient. States Dr Anuja Garcia submitted an order for a right breast ultrasound she she needs the diagnostic mammogram as well. Order submitted.

## 2023-04-28 DIAGNOSIS — R92.8 ABNORMAL MAMMOGRAM: Primary | ICD-10-CM

## 2023-04-30 RX ORDER — METRONIDAZOLE 500 MG/1
500 TABLET ORAL 2 TIMES DAILY
Qty: 14 TABLET | Refills: 0 | Status: SHIPPED | OUTPATIENT
Start: 2023-04-30 | End: 2023-05-07

## 2023-05-02 ENCOUNTER — TELEPHONE (OUTPATIENT)
Dept: OBGYN CLINIC | Age: 33
End: 2023-05-02

## 2023-05-02 DIAGNOSIS — B37.31 YEAST INFECTION OF THE VAGINA: Primary | ICD-10-CM

## 2023-05-02 RX ORDER — FLUCONAZOLE 150 MG/1
150 TABLET ORAL DAILY
Qty: 1 TABLET | Refills: 0 | Status: SHIPPED | OUTPATIENT
Start: 2023-05-02 | End: 2023-05-03

## 2023-05-04 DIAGNOSIS — N63.10 MASS OF RIGHT BREAST, UNSPECIFIED QUADRANT: Primary | ICD-10-CM

## 2023-05-13 ENCOUNTER — TRANSCRIBE ORDERS (OUTPATIENT)
Facility: HOSPITAL | Age: 33
End: 2023-05-13

## 2023-05-13 DIAGNOSIS — N63.0 MASS OF BREAST, UNSPECIFIED LATERALITY: Primary | ICD-10-CM

## 2023-05-21 RX ORDER — VALACYCLOVIR HYDROCHLORIDE 500 MG/1
TABLET, FILM COATED ORAL
COMMUNITY
Start: 2023-04-24

## 2023-05-21 RX ORDER — ACYCLOVIR 50 MG/G
OINTMENT TOPICAL
COMMUNITY
Start: 2023-04-24

## 2023-06-21 ENCOUNTER — TRANSCRIBE ORDERS (OUTPATIENT)
Facility: HOSPITAL | Age: 33
End: 2023-06-21

## 2023-06-21 DIAGNOSIS — N63.10 MASS OF RIGHT BREAST, UNSPECIFIED QUADRANT: Primary | ICD-10-CM

## 2023-07-10 ENCOUNTER — OFFICE VISIT (OUTPATIENT)
Age: 33
End: 2023-07-10
Payer: MEDICAID

## 2023-07-10 VITALS — SYSTOLIC BLOOD PRESSURE: 147 MMHG | DIASTOLIC BLOOD PRESSURE: 92 MMHG

## 2023-07-10 DIAGNOSIS — Z11.3 SCREENING EXAMINATION FOR VENEREAL DISEASE: Primary | ICD-10-CM

## 2023-07-10 PROCEDURE — 99212 OFFICE O/P EST SF 10 MIN: CPT | Performed by: OBSTETRICS & GYNECOLOGY

## 2023-07-10 NOTE — PROGRESS NOTES
Abeba Sharp is a 33 y.o. female who presents today for the following:  Chief Complaint   Patient presents with    Check-Up     Pt. Wants std swab and blood work          HPI  Patient is a 36-year-old  A3 female who presents today desiring STD screening. She does complain of midcycle spotting this month and frequency of urination.     OB History          4    Para   1    Term   1            AB   3    Living   1         SAB   2    IAB        Ectopic   1    Molar        Multiple        Live Births   1                        @VS2@   OBGyn Exam   Patient is a well-developed well-nourished female no apparent distress  She is alert and oriented x3  Pelvic  External genitalia are within normal limits  Urethra is midline there are no apparent urethral lesions the bladder is within normal limits  Vagina is with normal rugated there is some brownish discharge present in the vaginal vault  Cervix is parous, there are no apparent cervical lesions, there is no cervical motion tenderness  Urinalysis is negative  [unfilled]       Assessment:  Desired STD screening  Plan: NU swab sent, STD blood work ordered, follow-up as needed and yearly    Orders Placed This Encounter   Procedures    NuSwab Vaginitis Plus (VG+) with Candida (Six Species) ([facility])    Hepatitis Panel, Acute    RPR    HIV 1/2 Ag/Ab, 4TH Generation,W Rflx Confirm

## 2023-07-11 LAB
HAV IGM SERPL QL IA: NEGATIVE
HBV CORE IGM SERPL QL IA: NEGATIVE
HBV SURFACE AG SERPL QL IA: NEGATIVE
HCV AB SERPL QL IA: NORMAL
HCV IGG SERPL QL IA: NON REACTIVE
HIV 1+2 AB+HIV1 P24 AG SERPL QL IA: NON REACTIVE
RPR SER QL: NON REACTIVE

## 2023-07-14 RX ORDER — METRONIDAZOLE 500 MG/1
500 TABLET ORAL 2 TIMES DAILY
Qty: 14 TABLET | Refills: 0 | Status: SHIPPED | OUTPATIENT
Start: 2023-07-14 | End: 2023-07-21

## 2023-08-07 DIAGNOSIS — N89.8 VAGINAL ITCHING: Primary | ICD-10-CM

## 2023-08-07 RX ORDER — FLUCONAZOLE 150 MG/1
150 TABLET ORAL ONCE
Qty: 1 TABLET | Refills: 0 | Status: SHIPPED | OUTPATIENT
Start: 2023-08-07 | End: 2023-08-07

## 2023-08-11 ENCOUNTER — TELEPHONE (OUTPATIENT)
Age: 33
End: 2023-08-11

## 2023-08-11 NOTE — TELEPHONE ENCOUNTER
08/11/23 2:28PM R/T call to the patient as she left a   08/10/23 requesting an appt for breast changes---unable to reach the patient--LVM for the patient to call back.

## 2023-10-30 ENCOUNTER — HOSPITAL ENCOUNTER (OUTPATIENT)
Facility: HOSPITAL | Age: 33
Discharge: HOME | End: 2023-11-02
Attending: SURGERY
Payer: MEDICAID

## 2023-10-30 DIAGNOSIS — N63.10 MASS OF RIGHT BREAST, UNSPECIFIED QUADRANT: ICD-10-CM

## 2023-10-30 DIAGNOSIS — N89.8 VAGINAL DISCHARGE: Primary | ICD-10-CM

## 2023-10-30 PROCEDURE — 76642 ULTRASOUND BREAST LIMITED: CPT

## 2023-10-30 RX ORDER — METRONIDAZOLE 500 MG/1
500 TABLET ORAL 2 TIMES DAILY
Qty: 14 TABLET | Refills: 0 | Status: SHIPPED | OUTPATIENT
Start: 2023-10-30 | End: 2023-11-06

## 2023-11-02 ENCOUNTER — TELEPHONE (OUTPATIENT)
Age: 33
End: 2023-11-02

## 2023-12-27 ENCOUNTER — OFFICE VISIT (OUTPATIENT)
Age: 33
End: 2023-12-27
Payer: MEDICAID

## 2023-12-27 VITALS
BODY MASS INDEX: 36.19 KG/M2 | DIASTOLIC BLOOD PRESSURE: 98 MMHG | OXYGEN SATURATION: 99 % | RESPIRATION RATE: 16 BRPM | HEART RATE: 60 BPM | WEIGHT: 179.5 LBS | HEIGHT: 59 IN | SYSTOLIC BLOOD PRESSURE: 145 MMHG

## 2023-12-27 DIAGNOSIS — N89.8 VAGINAL DISCHARGE: Primary | ICD-10-CM

## 2023-12-27 DIAGNOSIS — B37.31 CANDIDAL VULVOVAGINITIS: ICD-10-CM

## 2023-12-27 PROCEDURE — 99213 OFFICE O/P EST LOW 20 MIN: CPT | Performed by: OBSTETRICS & GYNECOLOGY

## 2023-12-27 RX ORDER — FLUCONAZOLE 150 MG/1
150 TABLET ORAL ONCE
Qty: 1 TABLET | Refills: 0 | Status: SHIPPED | OUTPATIENT
Start: 2023-12-27 | End: 2023-12-27

## 2023-12-27 NOTE — PROGRESS NOTES
Raj Salinas is a 29 y.o. female who presents today for the following:    Chief Complaint   Patient presents with    Other     C/O discharge with odor        No Known Allergies       Current Outpatient Medications:     fluconazole (DIFLUCAN) 150 MG tablet, Take 1 tablet by mouth once for 1 dose, Disp: 1 tablet, Rfl: 0    acyclovir (ZOVIRAX) 5 % ointment, Apply dime sized amount for every 4inch square surface area 6 times daily (every 3 hours) for 7 days. (Patient not taking: Reported on 2023), Disp: , Rfl:     valACYclovir (VALTREX) 500 MG tablet, Take one tablet by mouth twice a day for 3 days for an outbreak. Otherwise take 1 tablet by mouth daily for suppression.  (Patient not taking: Reported on 2023), Disp: , Rfl:      Past Medical History:   Diagnosis Date    Anxiety     Herpes simplex     Intramural leiomyoma of uterus 3/31/2022    1.2cm    Trichomonas vaginalis (TV) infection 2/10/2021        Past Surgical History:   Procedure Laterality Date     SECTION      DILATION AND CURETTAGE OF UTERUS      OTHER SURGICAL HISTORY      Laparoscopy salpingostomy for ectopic (Dr Sam Kevin)    WISDOM TOOTH EXTRACTION          Family History   Adopted: Yes   Family history unknown: Yes        Social History     Socioeconomic History    Marital status: Single     Spouse name: None    Number of children: None    Years of education: None    Highest education level: None   Tobacco Use    Smoking status: Never    Smokeless tobacco: Never   Vaping Use    Vaping Use: Never used   Substance and Sexual Activity    Alcohol use: Not Currently    Drug use: Not Currently    Sexual activity: Yes     Partners: Male     Comment: denies h/o abnml pap smears; h/o trichomonas and HSV     Social Determinants of Health     Physical Activity: Inactive (2023)    Exercise Vital Sign     Days of Exercise per Week: 0 days     Minutes of Exercise per Session: 0 min   Intimate Partner Violence: Not At Risk (2023)    Humiliation,

## 2023-12-29 LAB
A VAGINAE DNA VAG QL NAA+PROBE: ABNORMAL SCORE
BVAB2 DNA VAG QL NAA+PROBE: ABNORMAL SCORE
C ALBICANS DNA VAG QL NAA+PROBE: NEGATIVE
C GLABRATA DNA VAG QL NAA+PROBE: NEGATIVE
C TRACH DNA VAG QL NAA+PROBE: NEGATIVE
MEGA1 DNA VAG QL NAA+PROBE: ABNORMAL SCORE
N GONORRHOEA DNA VAG QL NAA+PROBE: NEGATIVE
T VAGINALIS DNA VAG QL NAA+PROBE: NEGATIVE

## 2024-01-04 DIAGNOSIS — N76.0 BV (BACTERIAL VAGINOSIS): Primary | ICD-10-CM

## 2024-01-04 DIAGNOSIS — B96.89 BV (BACTERIAL VAGINOSIS): Primary | ICD-10-CM

## 2024-01-04 RX ORDER — METRONIDAZOLE 500 MG/1
500 TABLET ORAL 2 TIMES DAILY
Qty: 14 TABLET | Refills: 0 | Status: SHIPPED | OUTPATIENT
Start: 2024-01-04 | End: 2024-01-11

## 2024-03-17 DIAGNOSIS — B96.89 BACTERIAL VAGINOSIS: Primary | ICD-10-CM

## 2024-03-17 DIAGNOSIS — N76.0 BACTERIAL VAGINOSIS: Primary | ICD-10-CM

## 2024-03-17 RX ORDER — METRONIDAZOLE 500 MG/1
500 TABLET ORAL 2 TIMES DAILY
Qty: 14 TABLET | Refills: 0 | Status: SHIPPED | OUTPATIENT
Start: 2024-03-17 | End: 2024-03-24

## 2024-04-09 ENCOUNTER — TELEPHONE (OUTPATIENT)
Age: 34
End: 2024-04-09

## 2024-04-09 DIAGNOSIS — N76.0 ACUTE VAGINITIS: Primary | ICD-10-CM

## 2024-04-09 NOTE — TELEPHONE ENCOUNTER
Patient contacted the office reports having some vaginal itching. She reports was sent medication for bv about a few weeks ago but thinks she now has yeast infection.  Please review and advise if we can send medication to pharmacy to treat?

## 2024-04-10 DIAGNOSIS — B37.31 CANDIDAL VULVOVAGINITIS: Primary | ICD-10-CM

## 2024-04-10 RX ORDER — FLUCONAZOLE 150 MG/1
150 TABLET ORAL ONCE
Qty: 1 TABLET | Refills: 0 | Status: SHIPPED | OUTPATIENT
Start: 2024-04-10 | End: 2024-04-10 | Stop reason: CLARIF

## 2024-04-10 RX ORDER — FLUCONAZOLE 150 MG/1
150 TABLET ORAL ONCE
Qty: 1 TABLET | Refills: 0 | Status: SHIPPED | OUTPATIENT
Start: 2024-04-10 | End: 2024-04-10

## 2024-05-03 ENCOUNTER — OFFICE VISIT (OUTPATIENT)
Age: 34
End: 2024-05-03

## 2024-05-03 VITALS
RESPIRATION RATE: 16 BRPM | TEMPERATURE: 98.4 F | HEIGHT: 59 IN | BODY MASS INDEX: 36.77 KG/M2 | HEART RATE: 65 BPM | SYSTOLIC BLOOD PRESSURE: 149 MMHG | WEIGHT: 182.38 LBS | DIASTOLIC BLOOD PRESSURE: 103 MMHG

## 2024-05-03 DIAGNOSIS — Z11.3 SCREENING FOR STD (SEXUALLY TRANSMITTED DISEASE): Primary | ICD-10-CM

## 2024-05-03 RX ORDER — TRAZODONE HYDROCHLORIDE 50 MG/1
50 TABLET ORAL NIGHTLY
COMMUNITY
Start: 2024-03-20

## 2024-05-03 RX ORDER — FLUOXETINE 10 MG/1
CAPSULE ORAL
COMMUNITY
Start: 2024-04-25

## 2024-05-03 RX ORDER — ERGOCALCIFEROL 1.25 MG/1
50000 CAPSULE ORAL WEEKLY
COMMUNITY
Start: 2024-04-25

## 2024-05-03 NOTE — PROGRESS NOTES
Tomasa is a 33 y.o. female who presents today for the following:    Chief Complaint   Patient presents with    Other     C/O vaginal itching and odor; wants STD, BV, and yeast culture        No Known Allergies       Current Outpatient Medications:     vitamin D (ERGOCALCIFEROL) 1.25 MG (10647 UT) CAPS capsule, Take 1 capsule by mouth Once a week at 5 PM, Disp: , Rfl:     FLUoxetine (PROZAC) 10 MG capsule, take 1 capsule by mouth IN THE MORNING for 1 week then INCREASE to 2 capsules IN THE MORNING, Disp: , Rfl:     traZODone (DESYREL) 50 MG tablet, Take 1 tablet by mouth nightly, Disp: , Rfl:     acyclovir (ZOVIRAX) 5 % ointment, Apply dime sized amount for every 4inch square surface area 6 times daily (every 3 hours) for 7 days. (Patient not taking: Reported on 2023), Disp: , Rfl:     valACYclovir (VALTREX) 500 MG tablet, Take one tablet by mouth twice a day for 3 days for an outbreak.  Otherwise take 1 tablet by mouth daily for suppression. (Patient not taking: Reported on 2023), Disp: , Rfl:      Past Medical History:   Diagnosis Date    Anxiety     Herpes simplex     Intramural leiomyoma of uterus 3/31/2022    1.2cm    Trichomonas vaginalis (TV) infection 2/10/2021        Past Surgical History:   Procedure Laterality Date     SECTION      DILATION AND CURETTAGE OF UTERUS      OTHER SURGICAL HISTORY      Laparoscopy salpingostomy for ectopic (Dr Larson)    WISDOM TOOTH EXTRACTION          Family History   Adopted: Yes   Family history unknown: Yes        Social History     Socioeconomic History    Marital status: Single     Spouse name: None    Number of children: None    Years of education: None    Highest education level: None   Tobacco Use    Smoking status: Never    Smokeless tobacco: Never   Vaping Use    Vaping Use: Never used   Substance and Sexual Activity    Alcohol use: Not Currently    Drug use: Not Currently    Sexual activity: Yes     Partners: Male     Comment: denies h/o abnml

## 2024-05-04 LAB
HBV SURFACE AG SERPL QL IA: NEGATIVE
HCV IGG SERPL QL IA: NON REACTIVE
HIV 1+2 AB+HIV1 P24 AG SERPL QL IA: NON REACTIVE
HSV1 IGG SER IA-ACNC: 4.21 INDEX (ref 0–0.9)
HSV2 IGG SER IA-ACNC: 8.82 INDEX (ref 0–0.9)

## 2024-05-06 LAB
A VAGINAE DNA VAG QL NAA+PROBE: ABNORMAL SCORE
BVAB2 DNA VAG QL NAA+PROBE: ABNORMAL SCORE
C ALBICANS DNA VAG QL NAA+PROBE: NEGATIVE
C GLABRATA DNA VAG QL NAA+PROBE: NEGATIVE
C TRACH DNA VAG QL NAA+PROBE: NEGATIVE
MEGA1 DNA VAG QL NAA+PROBE: ABNORMAL SCORE
N GONORRHOEA DNA VAG QL NAA+PROBE: NEGATIVE
T VAGINALIS DNA VAG QL NAA+PROBE: NEGATIVE
TREPONEMA PALLIDUM IGG+IGM AB [PRESENCE] IN SERUM OR PLASMA BY IMMUNOASSAY: NON REACTIVE

## 2024-05-09 DIAGNOSIS — N89.8 VAGINAL ODOR: Primary | ICD-10-CM

## 2024-05-09 RX ORDER — METRONIDAZOLE 500 MG/1
500 TABLET ORAL 2 TIMES DAILY
Qty: 14 TABLET | Refills: 0 | Status: SHIPPED | OUTPATIENT
Start: 2024-05-09 | End: 2024-05-16

## 2024-09-13 ENCOUNTER — OFFICE VISIT (OUTPATIENT)
Age: 34
End: 2024-09-13
Payer: MEDICAID

## 2024-09-13 VITALS
DIASTOLIC BLOOD PRESSURE: 87 MMHG | OXYGEN SATURATION: 99 % | TEMPERATURE: 97.4 F | WEIGHT: 180.31 LBS | RESPIRATION RATE: 16 BRPM | BODY MASS INDEX: 36.35 KG/M2 | HEIGHT: 59 IN | HEART RATE: 60 BPM | SYSTOLIC BLOOD PRESSURE: 138 MMHG

## 2024-09-13 DIAGNOSIS — Z87.42 HISTORY OF IRREGULAR MENSTRUAL CYCLES: ICD-10-CM

## 2024-09-13 DIAGNOSIS — Z01.419 ENCOUNTER FOR ANNUAL ROUTINE GYNECOLOGICAL EXAMINATION: Primary | ICD-10-CM

## 2024-09-13 DIAGNOSIS — Z11.3 SCREENING FOR STD (SEXUALLY TRANSMITTED DISEASE): ICD-10-CM

## 2024-09-13 DIAGNOSIS — N89.8 VAGINAL DISCHARGE: ICD-10-CM

## 2024-09-13 PROCEDURE — 99395 PREV VISIT EST AGE 18-39: CPT | Performed by: OBSTETRICS & GYNECOLOGY

## 2024-09-13 RX ORDER — AMOXICILLIN 500 MG/1
TABLET, FILM COATED ORAL
COMMUNITY
Start: 2024-09-09

## 2024-09-13 SDOH — ECONOMIC STABILITY: INCOME INSECURITY: HOW HARD IS IT FOR YOU TO PAY FOR THE VERY BASICS LIKE FOOD, HOUSING, MEDICAL CARE, AND HEATING?: NOT HARD AT ALL

## 2024-09-13 SDOH — ECONOMIC STABILITY: FOOD INSECURITY: WITHIN THE PAST 12 MONTHS, YOU WORRIED THAT YOUR FOOD WOULD RUN OUT BEFORE YOU GOT MONEY TO BUY MORE.: NEVER TRUE

## 2024-09-13 SDOH — ECONOMIC STABILITY: FOOD INSECURITY: WITHIN THE PAST 12 MONTHS, THE FOOD YOU BOUGHT JUST DIDN'T LAST AND YOU DIDN'T HAVE MONEY TO GET MORE.: NEVER TRUE

## 2024-09-13 ASSESSMENT — PATIENT HEALTH QUESTIONNAIRE - PHQ9
SUM OF ALL RESPONSES TO PHQ QUESTIONS 1-9: 0
2. FEELING DOWN, DEPRESSED OR HOPELESS: NOT AT ALL
SUM OF ALL RESPONSES TO PHQ9 QUESTIONS 1 & 2: 0
SUM OF ALL RESPONSES TO PHQ QUESTIONS 1-9: 0
1. LITTLE INTEREST OR PLEASURE IN DOING THINGS: NOT AT ALL

## 2024-09-14 LAB
HBV SURFACE AG SERPL QL IA: NEGATIVE
HCV IGG SERPL QL IA: NON REACTIVE
HIV 1+2 AB+HIV1 P24 AG SERPL QL IA: NON REACTIVE
PROLACTIN SERPL-MCNC: 12.8 NG/ML (ref 4.8–33.4)
TSH SERPL DL<=0.005 MIU/L-ACNC: 1.69 UIU/ML (ref 0.45–4.5)

## 2024-09-15 LAB — TREPONEMA PALLIDUM IGG+IGM AB [PRESENCE] IN SERUM OR PLASMA BY IMMUNOASSAY: NON REACTIVE

## 2024-09-16 LAB
A VAGINAE DNA VAG QL NAA+PROBE: ABNORMAL SCORE
BVAB2 DNA VAG QL NAA+PROBE: ABNORMAL SCORE
C ALBICANS DNA VAG QL NAA+PROBE: NEGATIVE
C GLABRATA DNA VAG QL NAA+PROBE: NEGATIVE
C TRACH DNA SPEC QL NAA+PROBE: NEGATIVE
MEGA1 DNA VAG QL NAA+PROBE: ABNORMAL SCORE
N GONORRHOEA DNA VAG QL NAA+PROBE: NEGATIVE
T VAGINALIS DNA VAG QL NAA+PROBE: NEGATIVE

## 2024-09-17 DIAGNOSIS — N76.0 BV (BACTERIAL VAGINOSIS): Primary | ICD-10-CM

## 2024-09-17 DIAGNOSIS — B96.89 BV (BACTERIAL VAGINOSIS): Primary | ICD-10-CM

## 2024-09-17 RX ORDER — METRONIDAZOLE 500 MG/1
500 TABLET ORAL 2 TIMES DAILY
Qty: 14 TABLET | Refills: 0 | Status: SHIPPED | OUTPATIENT
Start: 2024-09-17 | End: 2024-09-24

## 2024-12-05 DIAGNOSIS — N76.0 BV (BACTERIAL VAGINOSIS): Primary | ICD-10-CM

## 2024-12-05 DIAGNOSIS — B96.89 BV (BACTERIAL VAGINOSIS): Primary | ICD-10-CM

## 2024-12-05 RX ORDER — METRONIDAZOLE 500 MG/1
500 TABLET ORAL 2 TIMES DAILY
Qty: 14 TABLET | Refills: 0 | Status: SHIPPED | OUTPATIENT
Start: 2024-12-05 | End: 2024-12-12

## 2024-12-05 NOTE — PROGRESS NOTES
Per Dr. Rober west to send refill on metroniazole 500 mg 1 tablet twice a day for 7 days verbal order with readback.

## 2024-12-12 DIAGNOSIS — B37.31 CANDIDAL VULVOVAGINITIS: Primary | ICD-10-CM

## 2024-12-12 RX ORDER — FLUCONAZOLE 150 MG/1
150 TABLET ORAL ONCE
Qty: 1 TABLET | Refills: 0 | Status: SHIPPED | OUTPATIENT
Start: 2024-12-12 | End: 2024-12-12

## 2025-01-22 ENCOUNTER — OFFICE VISIT (OUTPATIENT)
Age: 35
End: 2025-01-22
Payer: MEDICAID

## 2025-01-22 VITALS
HEART RATE: 59 BPM | HEIGHT: 59 IN | DIASTOLIC BLOOD PRESSURE: 83 MMHG | TEMPERATURE: 98.1 F | WEIGHT: 189.25 LBS | SYSTOLIC BLOOD PRESSURE: 145 MMHG | BODY MASS INDEX: 38.15 KG/M2 | OXYGEN SATURATION: 99 % | RESPIRATION RATE: 16 BRPM

## 2025-01-22 DIAGNOSIS — N76.0 BACTERIAL VAGINOSIS: Primary | ICD-10-CM

## 2025-01-22 DIAGNOSIS — B96.89 BACTERIAL VAGINOSIS: Primary | ICD-10-CM

## 2025-01-22 DIAGNOSIS — N89.8 VAGINAL DISCHARGE: ICD-10-CM

## 2025-01-22 PROCEDURE — 99213 OFFICE O/P EST LOW 20 MIN: CPT | Performed by: OBSTETRICS & GYNECOLOGY

## 2025-01-22 PROCEDURE — 99459 PELVIC EXAMINATION: CPT | Performed by: OBSTETRICS & GYNECOLOGY

## 2025-01-22 RX ORDER — METRONIDAZOLE 500 MG/1
500 TABLET ORAL 2 TIMES DAILY
Qty: 14 TABLET | Refills: 0 | Status: SHIPPED | OUTPATIENT
Start: 2025-01-22 | End: 2025-01-29

## 2025-01-22 NOTE — PROGRESS NOTES
Tomasa is a 34 y.o. female who presents today for the following:    Chief Complaint   Patient presents with    Follow-up     BV; still has vaginal odor and odor to urine        No Known Allergies       Current Outpatient Medications:     metroNIDAZOLE (FLAGYL) 500 MG tablet, Take 1 tablet by mouth 2 times daily for 7 days, Disp: 14 tablet, Rfl: 0    amoxicillin (AMOXIL) 500 MG tablet, take 1 tablet by mouth three times a day for 7 days, Disp: , Rfl:     vitamin D (ERGOCALCIFEROL) 1.25 MG (91188 UT) CAPS capsule, Take 1 capsule by mouth Once a week at 5 PM, Disp: , Rfl:     FLUoxetine (PROZAC) 10 MG capsule, take 1 capsule by mouth IN THE MORNING for 1 week then INCREASE to 2 capsules IN THE MORNING, Disp: , Rfl:     traZODone (DESYREL) 50 MG tablet, Take 1 tablet by mouth nightly, Disp: , Rfl:     acyclovir (ZOVIRAX) 5 % ointment, Apply dime sized amount for every 4inch square surface area 6 times daily (every 3 hours) for 7 days. (Patient not taking: Reported on 2023), Disp: , Rfl:     valACYclovir (VALTREX) 500 MG tablet, Take one tablet by mouth twice a day for 3 days for an outbreak.  Otherwise take 1 tablet by mouth daily for suppression. (Patient not taking: Reported on 2023), Disp: , Rfl:      Past Medical History:   Diagnosis Date    Anxiety     Herpes simplex     Intramural leiomyoma of uterus 3/31/2022    1.2cm    Trichomonas vaginalis (TV) infection 2/10/2021        Past Surgical History:   Procedure Laterality Date     SECTION      DILATION AND CURETTAGE OF UTERUS      OTHER SURGICAL HISTORY      Laparoscopy salpingostomy for ectopic (Dr Larson)    WISDOM TOOTH EXTRACTION          Family History   Adopted: Yes   Family history unknown: Yes        Social History     Socioeconomic History    Marital status: Single     Spouse name: None    Number of children: None    Years of education: None    Highest education level: None   Tobacco Use    Smoking status: Never    Smokeless tobacco:

## 2025-01-25 NOTE — RESULT ENCOUNTER NOTE
Positive for bacterial vaginosis.  Patient is already on treatment.  Please advise patient to complete treatment as recommended.  Thank you

## 2025-02-14 DIAGNOSIS — B96.89 BACTERIAL VAGINOSIS: Primary | ICD-10-CM

## 2025-02-14 DIAGNOSIS — N76.0 BACTERIAL VAGINOSIS: Primary | ICD-10-CM

## 2025-02-14 RX ORDER — METRONIDAZOLE 7.5 MG/G
1 GEL VAGINAL
Qty: 70 G | Refills: 3 | Status: SHIPPED | OUTPATIENT
Start: 2025-02-17 | End: 2025-02-22

## 2025-03-05 DIAGNOSIS — N89.8 VAGINAL ITCHING: Primary | ICD-10-CM

## 2025-03-05 RX ORDER — FLUCONAZOLE 150 MG/1
150 TABLET ORAL ONCE
Qty: 1 TABLET | Refills: 0 | Status: SHIPPED | OUTPATIENT
Start: 2025-03-05 | End: 2025-03-05

## 2025-03-21 DIAGNOSIS — B37.31 CANDIDAL VULVOVAGINITIS: Primary | ICD-10-CM

## 2025-03-21 RX ORDER — FLUCONAZOLE 150 MG/1
150 TABLET ORAL ONCE
Qty: 1 TABLET | Refills: 0 | Status: SHIPPED | OUTPATIENT
Start: 2025-03-21 | End: 2025-03-21

## 2025-04-02 ENCOUNTER — PATIENT MESSAGE (OUTPATIENT)
Age: 35
End: 2025-04-02

## 2025-04-02 DIAGNOSIS — B37.31 CANDIDAL VULVOVAGINITIS: Primary | ICD-10-CM

## 2025-04-02 RX ORDER — TERCONAZOLE 4 MG/G
1 CREAM VAGINAL NIGHTLY
Qty: 45 G | Refills: 0 | Status: SHIPPED | OUTPATIENT
Start: 2025-04-02 | End: 2025-04-09

## 2025-04-02 NOTE — TELEPHONE ENCOUNTER
Prescription per md verbal order with readback Terazol 0.4% cream insert vaginal nightly for 7 nights

## 2025-04-04 ENCOUNTER — TELEPHONE (OUTPATIENT)
Age: 35
End: 2025-04-04

## 2025-04-08 ENCOUNTER — OFFICE VISIT (OUTPATIENT)
Age: 35
End: 2025-04-08
Payer: MEDICAID

## 2025-04-08 VITALS
WEIGHT: 187.06 LBS | SYSTOLIC BLOOD PRESSURE: 126 MMHG | HEIGHT: 59 IN | DIASTOLIC BLOOD PRESSURE: 83 MMHG | BODY MASS INDEX: 37.71 KG/M2

## 2025-04-08 DIAGNOSIS — N76.0 BV (BACTERIAL VAGINOSIS): Primary | ICD-10-CM

## 2025-04-08 DIAGNOSIS — B96.89 BV (BACTERIAL VAGINOSIS): Primary | ICD-10-CM

## 2025-04-08 PROCEDURE — 99212 OFFICE O/P EST SF 10 MIN: CPT | Performed by: OBSTETRICS & GYNECOLOGY

## 2025-04-08 NOTE — PROGRESS NOTES
Negative  Psychiatric/behavioral: Negative    Vitals:    04/08/25 0737   BP: 126/83      OBGyn Exam   Constitutional  .  Appearance: Well-nourished, well-developed, alert, in no acute distress    Neurologic/psychiatric  .  Mental status:   .  Orientation: Grossly oriented to person, place and time   .  Mood and affect: Normal mood, affect appropriate      No results found for this visit on 04/08/25.    No orders of the defined types were placed in this encounter.       ASSESSMENT:    1. BV (bacterial vaginosis)  Recommendations regarding how to prevent bacterial vaginosis given.  Patient oriented to notify if present with bacterial vaginosis symptoms again in order to consider long-term antibiotic therapy.       Return if symptoms worsen or fail to improve.     Inocencio Shi M.D.  [facility]  Obstetrics and Gynecology  [phone]

## 2025-09-03 ENCOUNTER — OFFICE VISIT (OUTPATIENT)
Age: 35
End: 2025-09-03
Payer: MEDICAID

## 2025-09-03 VITALS
WEIGHT: 174.6 LBS | HEART RATE: 64 BPM | BODY MASS INDEX: 35.26 KG/M2 | DIASTOLIC BLOOD PRESSURE: 93 MMHG | SYSTOLIC BLOOD PRESSURE: 155 MMHG

## 2025-09-03 DIAGNOSIS — N89.8 VAGINAL DISCHARGE: Primary | ICD-10-CM

## 2025-09-03 DIAGNOSIS — N89.8 VAGINAL ODOR: ICD-10-CM

## 2025-09-03 PROCEDURE — 99214 OFFICE O/P EST MOD 30 MIN: CPT | Performed by: NURSE PRACTITIONER

## 2025-09-03 RX ORDER — METRONIDAZOLE 500 MG/1
500 TABLET ORAL 2 TIMES DAILY
Qty: 14 TABLET | Refills: 0 | Status: SHIPPED | OUTPATIENT
Start: 2025-09-03 | End: 2025-09-10

## 2025-09-03 RX ORDER — FLUCONAZOLE 150 MG/1
150 TABLET ORAL ONCE
Qty: 1 TABLET | Refills: 0 | Status: SHIPPED | OUTPATIENT
Start: 2025-09-03 | End: 2025-09-03

## 2025-09-05 LAB
A VAGINAE DNA VAG QL NAA+PROBE: ABNORMAL SCORE
BVAB2 DNA VAG QL NAA+PROBE: ABNORMAL SCORE
C ALBICANS DNA VAG QL NAA+PROBE: NEGATIVE
C GLABRATA DNA VAG QL NAA+PROBE: NEGATIVE
C KRUSEI DNA VAG QL NAA+PROBE: NEGATIVE
C LUSITANIAE DNA VAG QL NAA+PROBE: NEGATIVE
C TRACH DNA SPEC QL NAA+PROBE: NEGATIVE
CANDIDA DNA VAG QL NAA+PROBE: NEGATIVE
MEGA1 DNA VAG QL NAA+PROBE: ABNORMAL SCORE
N GONORRHOEA DNA VAG QL NAA+PROBE: NEGATIVE
T VAGINALIS DNA VAG QL NAA+PROBE: NEGATIVE